# Patient Record
Sex: FEMALE | Race: WHITE | NOT HISPANIC OR LATINO | Employment: UNEMPLOYED | URBAN - METROPOLITAN AREA
[De-identification: names, ages, dates, MRNs, and addresses within clinical notes are randomized per-mention and may not be internally consistent; named-entity substitution may affect disease eponyms.]

---

## 2017-08-04 ENCOUNTER — TRANSFERRED RECORDS (OUTPATIENT)
Dept: HEALTH INFORMATION MANAGEMENT | Facility: CLINIC | Age: 52
End: 2017-08-04

## 2017-09-08 ENCOUNTER — TRANSFERRED RECORDS (OUTPATIENT)
Dept: HEALTH INFORMATION MANAGEMENT | Facility: CLINIC | Age: 52
End: 2017-09-08

## 2017-09-09 ENCOUNTER — TRANSFERRED RECORDS (OUTPATIENT)
Dept: HEALTH INFORMATION MANAGEMENT | Facility: CLINIC | Age: 52
End: 2017-09-09

## 2018-08-12 DIAGNOSIS — H53.10 SUBJECTIVE VISUAL DISTURBANCE: Primary | ICD-10-CM

## 2018-08-14 ENCOUNTER — OFFICE VISIT (OUTPATIENT)
Dept: OPHTHALMOLOGY | Facility: CLINIC | Age: 53
End: 2018-08-14
Attending: OPHTHALMOLOGY
Payer: COMMERCIAL

## 2018-08-14 DIAGNOSIS — H53.10 SUBJECTIVE VISUAL DISTURBANCE: ICD-10-CM

## 2018-08-14 DIAGNOSIS — H51.9 CONVERGENCE INSUFFICIENCY OR PALSY IN BINOCULAR EYE MOVEMENT: Primary | ICD-10-CM

## 2018-08-14 DIAGNOSIS — H47.10 OPTIC DISC EDEMA: ICD-10-CM

## 2018-08-14 PROCEDURE — 92133 CPTRZD OPH DX IMG PST SGM ON: CPT | Mod: ZF | Performed by: OPHTHALMOLOGY

## 2018-08-14 PROCEDURE — 92083 EXTENDED VISUAL FIELD XM: CPT | Mod: ZF | Performed by: OPHTHALMOLOGY

## 2018-08-14 PROCEDURE — G0463 HOSPITAL OUTPT CLINIC VISIT: HCPCS | Mod: 25,ZF

## 2018-08-14 PROCEDURE — 92060 SENSORIMOTOR EXAMINATION: CPT | Mod: ZF | Performed by: OPHTHALMOLOGY

## 2018-08-14 RX ORDER — LEVOTHYROXINE SODIUM 175 UG/1
175 TABLET ORAL DAILY
COMMUNITY

## 2018-08-14 RX ORDER — ACETAZOLAMIDE 500 MG/1
500 CAPSULE, EXTENDED RELEASE ORAL 2 TIMES DAILY
Qty: 60 CAPSULE | Refills: 9 | Status: SHIPPED | OUTPATIENT
Start: 2018-08-14

## 2018-08-14 RX ORDER — HYDROCHLOROTHIAZIDE 25 MG/1
25 TABLET ORAL
Status: ON HOLD | COMMUNITY
End: 2022-08-30

## 2018-08-14 RX ORDER — NAPROXEN 500 MG/1
500 TABLET ORAL
Status: ON HOLD | COMMUNITY
End: 2022-08-30

## 2018-08-14 RX ORDER — ALBUTEROL SULFATE 90 UG/1
2 AEROSOL, METERED RESPIRATORY (INHALATION) EVERY 4 HOURS PRN
COMMUNITY

## 2018-08-14 RX ORDER — BUDESONIDE AND FORMOTEROL FUMARATE DIHYDRATE 160; 4.5 UG/1; UG/1
2 AEROSOL RESPIRATORY (INHALATION) 2 TIMES DAILY
Status: ON HOLD | COMMUNITY
End: 2022-08-31

## 2018-08-14 RX ORDER — AMITRIPTYLINE HYDROCHLORIDE 100 MG/1
200 TABLET ORAL AT BEDTIME
Status: ON HOLD | COMMUNITY
End: 2022-09-02

## 2018-08-14 RX ORDER — LISINOPRIL 40 MG/1
20 TABLET ORAL
Status: ON HOLD | COMMUNITY
End: 2022-08-30

## 2018-08-14 RX ORDER — LOPERAMIDE HCL 2 MG
2 CAPSULE ORAL
Status: ON HOLD | COMMUNITY
End: 2022-08-30

## 2018-08-14 RX ORDER — METOPROLOL SUCCINATE 25 MG/1
25 TABLET, EXTENDED RELEASE ORAL
Status: ON HOLD | COMMUNITY
End: 2022-08-30

## 2018-08-14 ASSESSMENT — CUP TO DISC RATIO
OS_RATIO: 0.0
OD_RATIO: 0.0

## 2018-08-14 ASSESSMENT — VISUAL ACUITY
OD_CC+: +2
OS_CC: 20/25
OS_CC+: -2
OD_CC: 20/40
METHOD: SNELLEN - LINEAR
CORRECTION_TYPE: CONTACTS

## 2018-08-14 ASSESSMENT — EXTERNAL EXAM - RIGHT EYE: OD_EXAM: NORMAL

## 2018-08-14 ASSESSMENT — REFRACTION_WEARINGRX
OS_SPHERE: +3.00
OD_SPHERE: +3.25
SPECS_TYPE: CONTACTS

## 2018-08-14 ASSESSMENT — CONF VISUAL FIELD
OD_NORMAL: 1
OS_NORMAL: 1

## 2018-08-14 ASSESSMENT — EXTERNAL EXAM - LEFT EYE: OS_EXAM: NORMAL

## 2018-08-14 ASSESSMENT — TONOMETRY
OS_IOP_MMHG: 12
OD_IOP_MMHG: 15
IOP_METHOD: ICARE

## 2018-08-14 ASSESSMENT — SLIT LAMP EXAM - LIDS
COMMENTS: NORMAL
COMMENTS: NORMAL

## 2018-08-14 NOTE — NURSING NOTE
Chief Complaints and History of Present Illnesses   Patient presents with     Neurologic Problem     papilledema     HPI    Symptoms:              Comments:  Kylah is a 53 year old female referred for:    1. Papilledema  Seen at [facility] was done (Sept 2017) and revealed elevated opening pressure of 23 mmHg. She was started on 250 mg Diamox BID. Did not tolerate diamox. Caused allergic reaction (face was swollen).   Headaches started over two years ago. Thought they were sinus headaches. Intermittent. Daily. Can last all day. Improve with tylenol or ibuprofen. Some days are worse than others. Position does not affect headache.   No pulsatile tinnitus.   C/o blurred vision, that's worse when the headache is worse.   Brain MRI done august 2017 (at a hospital in [place]). Unremarkable per patient account.     Anitha DIALLO 1:17 PM August 14, 2018
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
## 2018-08-14 NOTE — MR AVS SNAPSHOT
After Visit Summary   2018    Kylah Gutierrez    MRN: [id number]           Patient Information     Date Of Birth          1965        Visit Information        Provider Department      2018 1:30 PM Chuck Ochoa MD [facility]        Today's Diagnoses     Convergence insufficiency or palsy in binocular eye movement    -  1    Subjective visual disturbance        Optic disc edema           Follow-ups after your visit        Your next 10 appointments already scheduled     Sep 05, 2018  3:00 PM CDT   RETURN NEURO with Chuck Ochoa MD   Eye Clinic ([facility])    [address]  [facility] [address]   [phone]              Future tests that were ordered for you today     Open Future Orders        Priority Expected Expires Ordered    MR Orbit w/o & w Contrast Routine  2019    MRV BRAIN  WO & W CONTRAST Routine  2019    MR Brain w/o & w Contrast Routine  2019            Who to contact     Please call your clinic at [phone] to:    Ask questions about your health    Make or cancel appointments    Discuss your medicines    Learn about your test results    Speak to your doctor            Additional Information About Your Visit        MyChart Information     XIFINhart is an electronic gateway that provides easy, online access to your medical records. With [employer], you can request a clinic appointment, read your test results, renew a prescription or communicate with your care team.     To sign up for Trendrt visit the website at [email or web address]   You will be asked to enter the access code listed below, as well as some personal information. Please follow the directions to create your username and password.     Your access code is: [email or web address]  Expires: 10/29/2018 12:16 PM     Your access code will  in 90 days. If you need help or a new code, please  contact your [facility] or call [phone] for assistance.        Care EveryWhere ID     This is your Care EveryWhere ID. This could be used by other organizations to access your [facility] medical records  [id number]         Blood Pressure from Last 3 Encounters:   No data found for BP    Weight from Last 3 Encounters:   No data found for Wt              We Performed the Following     Glaucoma Top OU     IOP Measurement     OCT Optic Nerve RNFL Spectralis OU (both eyes)     Sensorimotor          Today's Medication Changes          These changes are accurate as of 8/14/18  3:31 PM.  If you have any questions, ask your nurse or doctor.               Start taking these medicines.        Dose/Directions    acetaZOLAMIDE 500 MG 12 hr capsule   Commonly known as:  DIAMOX SEQUELS   Used for:  Optic disc edema   Started by:  Chuck Ochoa MD        Dose:  500 mg   Take 1 capsule (500 mg) by mouth 2 times daily   Quantity:  60 capsule   Refills:  9            Where to get your medicines      Some of these will need a paper prescription and others can be bought over the counter.  Ask your nurse if you have questions.     Bring a paper prescription for each of these medications     acetaZOLAMIDE 500 MG 12 hr capsule                Primary Care Provider Office Phone # Fax #    Ronna Jean [phone]       [place] [address]        Equal Access to Services     JB JOHN : Rojelio caballeroo Soomaali, waaxda luqadaha, qaybta kaalmada adeegyada, chandler angeles. So [facility] [phone].    ATENCIÓN: Si habla español, tiene a burr disposición servicios gratuitos de asistencia lingüística. Llame al [phone].    We comply with applicable federal civil rights laws and [place] laws. We do not discriminate on the basis of race, color, national origin, age, disability, sex, sexual  orientation, or gender identity.            Thank you!     Thank you for choosing EYE CLINIC  for your care. Our goal is always to provide you with excellent care. Hearing back from our patients is one way we can continue to improve our services. Please take a few minutes to complete the written survey that you may receive in the mail after your visit with us. Thank you!             Your Updated Medication List - Protect others around you: Learn how to safely use, store and throw away your medicines at www.disposemymeds.org.          This list is accurate as of 8/14/18  3:31 PM.  Always use your most recent med list.                   Brand Name Dispense Instructions for use Diagnosis    acetaZOLAMIDE 500 MG 12 hr capsule    DIAMOX SEQUELS    60 capsule    Take 1 capsule (500 mg) by mouth 2 times daily    Optic disc edema       amitriptyline 100 MG tablet    ELAVIL     Take 200 mg by mouth        hydrochlorothiazide 25 MG tablet    HYDRODIURIL     Take 25 mg by mouth        levothyroxine 50 MCG tablet    SYNTHROID/LEVOTHROID     Take 50 mcg by mouth        lisinopril 40 MG tablet    PRINIVIL/ZESTRIL     Take 20 mg by mouth        loperamide 2 MG capsule    IMODIUM     Take 2 mg by mouth        metoprolol succinate 25 MG 24 hr tablet    TOPROL-XL     Take 25 mg by mouth        naproxen 500 MG tablet    NAPROSYN     Take 500 mg by mouth        omeprazole 20 MG CR capsule    priLOSEC     Take 20 mg by mouth        PROAIR  (90 Base) MCG/ACT inhaler   Generic drug:  albuterol      Inhale 2 puffs into the lungs        ranitidine 150 MG tablet    ZANTAC     Take 150 mg by mouth        SUDAFED COUGH OR      Take 30 mg by mouth        SYMBICORT IN
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
## 2018-09-04 DIAGNOSIS — H47.10 PAPILLEDEMA: Primary | ICD-10-CM

## 2022-08-30 ENCOUNTER — APPOINTMENT (OUTPATIENT)
Dept: GENERAL RADIOLOGY | Facility: CLINIC | Age: 57
End: 2022-08-30
Attending: INTERNAL MEDICINE
Payer: COMMERCIAL

## 2022-08-30 ENCOUNTER — APPOINTMENT (OUTPATIENT)
Dept: CARDIOLOGY | Facility: CLINIC | Age: 57
End: 2022-08-30
Attending: INTERNAL MEDICINE
Payer: COMMERCIAL

## 2022-08-30 ENCOUNTER — HOSPITAL ENCOUNTER (INPATIENT)
Facility: CLINIC | Age: 57
LOS: 3 days | Discharge: HOME | End: 2022-09-02
Attending: INTERNAL MEDICINE | Admitting: INTERNAL MEDICINE
Payer: COMMERCIAL

## 2022-08-30 ENCOUNTER — TELEPHONE (OUTPATIENT)
Dept: NEUROLOGY | Facility: CLINIC | Age: 57
End: 2022-08-30

## 2022-08-30 ENCOUNTER — TRANSFERRED RECORDS (OUTPATIENT)
Dept: HEALTH INFORMATION MANAGEMENT | Facility: CLINIC | Age: 57
End: 2022-08-30

## 2022-08-30 ENCOUNTER — APPOINTMENT (OUTPATIENT)
Dept: MRI IMAGING | Facility: CLINIC | Age: 57
End: 2022-08-30
Attending: INTERNAL MEDICINE
Payer: COMMERCIAL

## 2022-08-30 PROBLEM — R47.01 EXPRESSIVE APHASIA: Status: ACTIVE | Noted: 2022-08-30

## 2022-08-30 LAB
ALBUMIN SERPL-MCNC: 3 G/DL (ref 3.4–5)
ALP SERPL-CCNC: 82 U/L (ref 40–150)
ALT SERPL W P-5'-P-CCNC: 22 U/L (ref 0–50)
AMMONIA PLAS-SCNC: 19 UMOL/L (ref 10–50)
ANION GAP SERPL CALCULATED.3IONS-SCNC: 7 MMOL/L (ref 3–14)
AST SERPL W P-5'-P-CCNC: 12 U/L (ref 0–45)
BASE EXCESS BLDV CALC-SCNC: -7.7 MMOL/L (ref -7.7–1.9)
BILIRUB SERPL-MCNC: 0.1 MG/DL (ref 0.2–1.3)
BUN SERPL-MCNC: 12 MG/DL (ref 7–30)
CA-I BLD-MCNC: 4.9 MG/DL (ref 4.4–5.2)
CALCIUM SERPL-MCNC: 8.3 MG/DL (ref 8.5–10.1)
CHLORIDE BLD-SCNC: 115 MMOL/L (ref 94–109)
CO2 SERPL-SCNC: 19 MMOL/L (ref 20–32)
CREAT SERPL-MCNC: 0.9 MG/DL (ref 0.52–1.04)
ERYTHROCYTE [DISTWIDTH] IN BLOOD BY AUTOMATED COUNT: 15.1 % (ref 10–15)
GFR SERPL CREATININE-BSD FRML MDRD: 75 ML/MIN/1.73M2
GLUCOSE BLD-MCNC: 150 MG/DL (ref 70–99)
GLUCOSE BLDC GLUCOMTR-MCNC: 118 MG/DL (ref 70–99)
GLUCOSE BLDC GLUCOMTR-MCNC: 137 MG/DL (ref 70–99)
GLUCOSE BLDC GLUCOMTR-MCNC: 79 MG/DL (ref 70–99)
GLUCOSE BLDC GLUCOMTR-MCNC: 89 MG/DL (ref 70–99)
HCO3 BLDV-SCNC: 19 MMOL/L (ref 21–28)
HCT VFR BLD AUTO: 37 % (ref 35–47)
HGB BLD-MCNC: 10.7 G/DL (ref 11.7–15.7)
INR PPP: 1.04 (ref 0.85–1.15)
LACTATE SERPL-SCNC: 1.4 MMOL/L (ref 0.7–2)
LVEF ECHO: NORMAL
MAGNESIUM SERPL-MCNC: 2.2 MG/DL (ref 1.6–2.3)
MCH RBC QN AUTO: 24.8 PG (ref 26.5–33)
MCHC RBC AUTO-ENTMCNC: 28.9 G/DL (ref 31.5–36.5)
MCV RBC AUTO: 86 FL (ref 78–100)
MRSA DNA SPEC QL NAA+PROBE: NEGATIVE
NT-PROBNP SERPL-MCNC: 304 PG/ML (ref 0–900)
O2/TOTAL GAS SETTING VFR VENT: 0 %
PCO2 BLDV: 43 MM HG (ref 40–50)
PH BLDV: 7.26 [PH] (ref 7.32–7.43)
PHOSPHATE SERPL-MCNC: 3.3 MG/DL (ref 2.5–4.5)
PLATELET # BLD AUTO: 292 10E3/UL (ref 150–450)
PO2 BLDV: 46 MM HG (ref 25–47)
POTASSIUM BLD-SCNC: 3.9 MMOL/L (ref 3.4–5.3)
PROCALCITONIN SERPL-MCNC: 0.12 NG/ML
PROT SERPL-MCNC: 6.4 G/DL (ref 6.8–8.8)
RBC # BLD AUTO: 4.31 10E6/UL (ref 3.8–5.2)
SA TARGET DNA: NEGATIVE
SODIUM SERPL-SCNC: 141 MMOL/L (ref 133–144)
T4 FREE SERPL-MCNC: 0.98 NG/DL (ref 0.76–1.46)
TROPONIN I SERPL HS-MCNC: 9 NG/L
TSH SERPL DL<=0.005 MIU/L-ACNC: 0.13 MU/L (ref 0.4–4)
WBC # BLD AUTO: 8 10E3/UL (ref 4–11)

## 2022-08-30 PROCEDURE — 84443 ASSAY THYROID STIM HORMONE: CPT | Performed by: INTERNAL MEDICINE

## 2022-08-30 PROCEDURE — 70553 MRI BRAIN STEM W/O & W/DYE: CPT

## 2022-08-30 PROCEDURE — 99292 CRITICAL CARE ADDL 30 MIN: CPT | Performed by: INTERNAL MEDICINE

## 2022-08-30 PROCEDURE — 82330 ASSAY OF CALCIUM: CPT | Performed by: INTERNAL MEDICINE

## 2022-08-30 PROCEDURE — 84145 PROCALCITONIN (PCT): CPT | Performed by: INTERNAL MEDICINE

## 2022-08-30 PROCEDURE — 84100 ASSAY OF PHOSPHORUS: CPT | Performed by: INTERNAL MEDICINE

## 2022-08-30 PROCEDURE — 999N000157 HC STATISTIC RCP TIME EA 10 MIN

## 2022-08-30 PROCEDURE — C9113 INJ PANTOPRAZOLE SODIUM, VIA: HCPCS | Performed by: INTERNAL MEDICINE

## 2022-08-30 PROCEDURE — 999N000065 XR CHEST PORT 1 VIEW

## 2022-08-30 PROCEDURE — 93005 ELECTROCARDIOGRAM TRACING: CPT

## 2022-08-30 PROCEDURE — 84484 ASSAY OF TROPONIN QUANT: CPT | Performed by: INTERNAL MEDICINE

## 2022-08-30 PROCEDURE — 74018 RADEX ABDOMEN 1 VIEW: CPT

## 2022-08-30 PROCEDURE — 250N000009 HC RX 250: Performed by: INTERNAL MEDICINE

## 2022-08-30 PROCEDURE — 83880 ASSAY OF NATRIURETIC PEPTIDE: CPT | Performed by: INTERNAL MEDICINE

## 2022-08-30 PROCEDURE — 999N000128 HC STATISTIC PERIPHERAL IV START W/O US GUIDANCE

## 2022-08-30 PROCEDURE — 999N000009 HC STATISTIC AIRWAY CARE

## 2022-08-30 PROCEDURE — 200N000001 HC R&B ICU

## 2022-08-30 PROCEDURE — 82140 ASSAY OF AMMONIA: CPT | Performed by: INTERNAL MEDICINE

## 2022-08-30 PROCEDURE — 82803 BLOOD GASES ANY COMBINATION: CPT | Performed by: INTERNAL MEDICINE

## 2022-08-30 PROCEDURE — 255N000002 HC RX 255 OP 636: Performed by: INTERNAL MEDICINE

## 2022-08-30 PROCEDURE — 83735 ASSAY OF MAGNESIUM: CPT | Performed by: INTERNAL MEDICINE

## 2022-08-30 PROCEDURE — 250N000013 HC RX MED GY IP 250 OP 250 PS 637: Performed by: INTERNAL MEDICINE

## 2022-08-30 PROCEDURE — 258N000003 HC RX IP 258 OP 636: Performed by: INTERNAL MEDICINE

## 2022-08-30 PROCEDURE — 94002 VENT MGMT INPAT INIT DAY: CPT

## 2022-08-30 PROCEDURE — 80053 COMPREHEN METABOLIC PANEL: CPT | Performed by: INTERNAL MEDICINE

## 2022-08-30 PROCEDURE — 250N000011 HC RX IP 250 OP 636

## 2022-08-30 PROCEDURE — 99207 PR NO BILLABLE SERVICE THIS VISIT: CPT | Performed by: INTERNAL MEDICINE

## 2022-08-30 PROCEDURE — A9585 GADOBUTROL INJECTION: HCPCS | Performed by: INTERNAL MEDICINE

## 2022-08-30 PROCEDURE — 82962 GLUCOSE BLOOD TEST: CPT

## 2022-08-30 PROCEDURE — 99291 CRITICAL CARE FIRST HOUR: CPT | Mod: GC | Performed by: STUDENT IN AN ORGANIZED HEALTH CARE EDUCATION/TRAINING PROGRAM

## 2022-08-30 PROCEDURE — 87040 BLOOD CULTURE FOR BACTERIA: CPT | Performed by: INTERNAL MEDICINE

## 2022-08-30 PROCEDURE — 80335 ANTIDEPRESSANT TRICYCLIC 1/2: CPT | Performed by: INTERNAL MEDICINE

## 2022-08-30 PROCEDURE — 999N000208 ECHOCARDIOGRAM COMPLETE

## 2022-08-30 PROCEDURE — 85610 PROTHROMBIN TIME: CPT | Performed by: INTERNAL MEDICINE

## 2022-08-30 PROCEDURE — 93010 ELECTROCARDIOGRAM REPORT: CPT | Mod: 76 | Performed by: INTERNAL MEDICINE

## 2022-08-30 PROCEDURE — 36415 COLL VENOUS BLD VENIPUNCTURE: CPT | Performed by: INTERNAL MEDICINE

## 2022-08-30 PROCEDURE — 84439 ASSAY OF FREE THYROXINE: CPT | Performed by: INTERNAL MEDICINE

## 2022-08-30 PROCEDURE — 93306 TTE W/DOPPLER COMPLETE: CPT | Mod: 26 | Performed by: INTERNAL MEDICINE

## 2022-08-30 PROCEDURE — 99291 CRITICAL CARE FIRST HOUR: CPT | Performed by: INTERNAL MEDICINE

## 2022-08-30 PROCEDURE — 87641 MR-STAPH DNA AMP PROBE: CPT | Performed by: INTERNAL MEDICINE

## 2022-08-30 PROCEDURE — 5A1935Z RESPIRATORY VENTILATION, LESS THAN 24 CONSECUTIVE HOURS: ICD-10-PCS | Performed by: INTERNAL MEDICINE

## 2022-08-30 PROCEDURE — 83605 ASSAY OF LACTIC ACID: CPT | Performed by: INTERNAL MEDICINE

## 2022-08-30 PROCEDURE — 85027 COMPLETE CBC AUTOMATED: CPT | Performed by: INTERNAL MEDICINE

## 2022-08-30 PROCEDURE — 250N000011 HC RX IP 250 OP 636: Performed by: INTERNAL MEDICINE

## 2022-08-30 RX ORDER — NALOXONE HYDROCHLORIDE 0.4 MG/ML
0.2 INJECTION, SOLUTION INTRAMUSCULAR; INTRAVENOUS; SUBCUTANEOUS
Status: DISCONTINUED | OUTPATIENT
Start: 2022-08-30 | End: 2022-09-02 | Stop reason: HOSPADM

## 2022-08-30 RX ORDER — SODIUM CHLORIDE 9 MG/ML
INJECTION, SOLUTION INTRAVENOUS CONTINUOUS
Status: DISCONTINUED | OUTPATIENT
Start: 2022-08-30 | End: 2022-09-01 | Stop reason: CLARIF

## 2022-08-30 RX ORDER — HYDROMORPHONE HCL IN WATER/PF 6 MG/30 ML
.2-.4 PATIENT CONTROLLED ANALGESIA SYRINGE INTRAVENOUS
Status: DISCONTINUED | OUTPATIENT
Start: 2022-08-30 | End: 2022-09-02 | Stop reason: HOSPADM

## 2022-08-30 RX ORDER — LEVOTHYROXINE SODIUM 175 UG/1
175 TABLET ORAL
Status: DISCONTINUED | OUTPATIENT
Start: 2022-08-30 | End: 2022-09-01

## 2022-08-30 RX ORDER — NALOXONE HYDROCHLORIDE 0.4 MG/ML
0.4 INJECTION, SOLUTION INTRAMUSCULAR; INTRAVENOUS; SUBCUTANEOUS
Status: DISCONTINUED | OUTPATIENT
Start: 2022-08-30 | End: 2022-09-02 | Stop reason: HOSPADM

## 2022-08-30 RX ORDER — VITAMIN B COMPLEX
1 TABLET ORAL DAILY
COMMUNITY

## 2022-08-30 RX ORDER — PROPOFOL 10 MG/ML
5-75 INJECTION, EMULSION INTRAVENOUS CONTINUOUS
Status: DISCONTINUED | OUTPATIENT
Start: 2022-08-30 | End: 2022-09-01 | Stop reason: CLARIF

## 2022-08-30 RX ORDER — ASPIRIN 81 MG/1
81 TABLET ORAL DAILY
COMMUNITY

## 2022-08-30 RX ORDER — CHLORHEXIDINE GLUCONATE ORAL RINSE 1.2 MG/ML
15 SOLUTION DENTAL EVERY 12 HOURS
Status: DISCONTINUED | OUTPATIENT
Start: 2022-08-30 | End: 2022-09-01

## 2022-08-30 RX ORDER — LOSARTAN POTASSIUM 50 MG/1
50 TABLET ORAL DAILY
COMMUNITY

## 2022-08-30 RX ORDER — AMPICILLIN AND SULBACTAM 2; 1 G/1; G/1
3 INJECTION, POWDER, FOR SOLUTION INTRAMUSCULAR; INTRAVENOUS EVERY 6 HOURS
Status: DISCONTINUED | OUTPATIENT
Start: 2022-08-30 | End: 2022-09-01

## 2022-08-30 RX ORDER — FAMOTIDINE 20 MG/1
20 TABLET, FILM COATED ORAL 2 TIMES DAILY
COMMUNITY

## 2022-08-30 RX ORDER — DEXTROSE MONOHYDRATE 25 G/50ML
25-50 INJECTION, SOLUTION INTRAVENOUS
Status: DISCONTINUED | OUTPATIENT
Start: 2022-08-30 | End: 2022-09-02 | Stop reason: HOSPADM

## 2022-08-30 RX ORDER — PROPOFOL 10 MG/ML
INJECTION, EMULSION INTRAVENOUS
Status: COMPLETED
Start: 2022-08-30 | End: 2022-08-30

## 2022-08-30 RX ORDER — NICOTINE POLACRILEX 4 MG
15-30 LOZENGE BUCCAL
Status: DISCONTINUED | OUTPATIENT
Start: 2022-08-30 | End: 2022-09-02 | Stop reason: HOSPADM

## 2022-08-30 RX ORDER — ASPIRIN 81 MG/1
81 TABLET, CHEWABLE ORAL DAILY
Status: DISCONTINUED | OUTPATIENT
Start: 2022-08-30 | End: 2022-09-02

## 2022-08-30 RX ORDER — ROSUVASTATIN CALCIUM 40 MG/1
40 TABLET, COATED ORAL AT BEDTIME
COMMUNITY

## 2022-08-30 RX ORDER — GADOBUTROL 604.72 MG/ML
10 INJECTION INTRAVENOUS ONCE
Status: COMPLETED | OUTPATIENT
Start: 2022-08-30 | End: 2022-08-30

## 2022-08-30 RX ORDER — METOPROLOL TARTRATE 25 MG/1
25 TABLET, FILM COATED ORAL 2 TIMES DAILY
COMMUNITY

## 2022-08-30 RX ORDER — ALBUTEROL SULFATE 0.83 MG/ML
2.5 SOLUTION RESPIRATORY (INHALATION)
Status: DISCONTINUED | OUTPATIENT
Start: 2022-08-30 | End: 2022-09-02 | Stop reason: HOSPADM

## 2022-08-30 RX ADMIN — HYDROMORPHONE HYDROCHLORIDE 0.2 MG: 0.2 INJECTION, SOLUTION INTRAMUSCULAR; INTRAVENOUS; SUBCUTANEOUS at 15:46

## 2022-08-30 RX ADMIN — HYDROMORPHONE HYDROCHLORIDE 0.2 MG: 0.2 INJECTION, SOLUTION INTRAMUSCULAR; INTRAVENOUS; SUBCUTANEOUS at 15:14

## 2022-08-30 RX ADMIN — CHLORHEXIDINE GLUCONATE 15 ML: 1.2 SOLUTION ORAL at 08:20

## 2022-08-30 RX ADMIN — AMPICILLIN SODIUM AND SULBACTAM SODIUM 3 G: 2; 1 INJECTION, POWDER, FOR SOLUTION INTRAMUSCULAR; INTRAVENOUS at 15:28

## 2022-08-30 RX ADMIN — PROPOFOL 35 MCG/KG/MIN: 10 INJECTION, EMULSION INTRAVENOUS at 12:48

## 2022-08-30 RX ADMIN — LEVOTHYROXINE SODIUM 175 MCG: 175 TABLET ORAL at 12:52

## 2022-08-30 RX ADMIN — PROPOFOL 10 MCG/KG/MIN: 10 INJECTION, EMULSION INTRAVENOUS at 05:45

## 2022-08-30 RX ADMIN — PROPOFOL 35 MCG/KG/MIN: 10 INJECTION, EMULSION INTRAVENOUS at 19:17

## 2022-08-30 RX ADMIN — PANTOPRAZOLE SODIUM 40 MG: 40 INJECTION, POWDER, FOR SOLUTION INTRAVENOUS at 08:16

## 2022-08-30 RX ADMIN — GADOBUTROL 8 ML: 604.72 INJECTION INTRAVENOUS at 17:05

## 2022-08-30 RX ADMIN — PROPOFOL 50 MCG/KG/MIN: 10 INJECTION, EMULSION INTRAVENOUS at 16:25

## 2022-08-30 RX ADMIN — SODIUM CHLORIDE: 9 INJECTION, SOLUTION INTRAVENOUS at 12:00

## 2022-08-30 RX ADMIN — SODIUM BICARBONATE 100 MEQ: 84 INJECTION, SOLUTION INTRAVENOUS at 20:37

## 2022-08-30 RX ADMIN — AMPICILLIN SODIUM AND SULBACTAM SODIUM 3 G: 2; 1 INJECTION, POWDER, FOR SOLUTION INTRAMUSCULAR; INTRAVENOUS at 20:36

## 2022-08-30 RX ADMIN — HYDROMORPHONE HYDROCHLORIDE 0.2 MG: 0.2 INJECTION, SOLUTION INTRAMUSCULAR; INTRAVENOUS; SUBCUTANEOUS at 11:25

## 2022-08-30 RX ADMIN — CHLORHEXIDINE GLUCONATE 15 ML: 1.2 SOLUTION ORAL at 20:37

## 2022-08-30 RX ADMIN — HYDROMORPHONE HYDROCHLORIDE 0.4 MG: 0.2 INJECTION, SOLUTION INTRAMUSCULAR; INTRAVENOUS; SUBCUTANEOUS at 21:03

## 2022-08-30 RX ADMIN — AMPICILLIN SODIUM AND SULBACTAM SODIUM 3 G: 2; 1 INJECTION, POWDER, FOR SOLUTION INTRAMUSCULAR; INTRAVENOUS at 08:10

## 2022-08-30 RX ADMIN — HUMAN ALBUMIN MICROSPHERES AND PERFLUTREN 9 ML: 10; .22 INJECTION, SOLUTION INTRAVENOUS at 15:18

## 2022-08-30 ASSESSMENT — ACTIVITIES OF DAILY LIVING (ADL)
ADLS_ACUITY_SCORE: 40
ADLS_ACUITY_SCORE: 41
ADLS_ACUITY_SCORE: 37
ADLS_ACUITY_SCORE: 40
ADLS_ACUITY_SCORE: 37
ADLS_ACUITY_SCORE: 40
ADLS_ACUITY_SCORE: 40

## 2022-08-30 NOTE — PLAN OF CARE
Neuro: RASS -2, meeting goal. Patient quickly becomes agitated when off sedation. PERRLA. Inconsistent in following commands. Moves all extremities.     Cardiac: Murmer. SR with PVC's    Respiratory: Vent CMV/AC. Small amount of thick, creamy secretions from ETT    GI: Last BM PTA    : Alexis in place, good output    Skin: No issues. Mepilex in place.     Shift Events: MRI brain completed. Update given to daughter Silvia by phone.     Plan of Care: Pending neurology plan.

## 2022-08-30 NOTE — PROGRESS NOTES
"Follow-up note. Please see H&P by Dr. Angela earlier today for further details.      Assessment and Plan:  This is a 57 year old woman with PMH significant for HTN, chronic intracranial hypertension, CAD s/p PCI of RCA 12/2021 who presented to an outside ED with confusion and agitation, concern for aphasia but with non-focal neuro exam, ultimately intubated to obtain imaging and transferred here for further workup and management. In reviewing outside ED notes in paper chart, nursing there noted patient said she \"took a bunch of drugs\" but did not say what she took.     Neuro:   #Encephalopathy  #Expressive aphasia concerning for CVA, TIA  #Chronic intracranial hypertension causing papilledema  #Depression  Exam remains non-focal. UDS from outside facility reviewed, positive for tricyclics (pt on amitriptyline) and benzos (pt received Ativan in ED), otherwise negative UDS. Tricyclic overdose certainly a possibility. QRS mildly elevated at 138 on admission, now improved at 120ms (per care everywhere last EKG 2019 with QRS 108ms). TSH mildly low but free T4 normal.   -Stat MRI ordered; currently pending safety checklist   -Neuro crit consulted, appreciate recs   - LP recommended. Will contact radiology regarding LP in the setting of outpatient ticagrelor therapy.   -Ammonia level, Tylenol, salicylates, ethanol within normal limits  -Repeat EKG in AM  -Blood cultures ordered  -Holding PTA amitriptyline. Levels drawn and pending.      Cardiac:   #CAD s/p NSTEMI in 12/2021 with PCI to mid-RCA   #Hx Aortic Stenosis  Last echo performed in 12/2/2021 showed LVEF of 60% and a heavily calcified aortic valve with moderate to severe stenosis and mild regurgitation.  EKG performed here and no ischemic changes and troponins have been negative. Patient's daughter, reached by telephone, reports pt had syncopal episode at some point in the recent past.   -Continue PTA metoprolol as able  -Will hold PTA aspirin 81 mg and ticagrelor " pending neuro evaluation  -Echo ordered to evaluate aortic stenosis.      Pulm:   Vent Mode: CMV/AC  (Continuous Mandatory Ventilation/ Assist Control)  FiO2 (%): 30 %  Resp Rate (Set): 20 breaths/min  Tidal Volume (Set, mL): 460 mL  PEEP (cm H2O): 5 cmH2O  Resp: 20  She was intubated in order to facilitate brain imaging given agitation and restlessness.  Chest x-ray shows ET tube in good position but hazy opacity in the mid left lung.  She is on minimal vent settings.  Given she is high risk for aspiration pneumonia and does have moderate amount of secretions, empiric Unasyn started. VBG shows mild metabolic acidosis at 7.26/43/46/19.   - sputum culture     Renal:   #Hx of CKD3 per chart review  #Mild non-anion gap metabolic acidosis.   Creatinine is 0.9 and she is making good urine via Palafox. VBG as above. Lactate WNL. Albumin corrected anion gap normal at 9.5. No reported vomiting/diarrhea prior to presentation and potassium normal. She did receive 1L NS bolus at outside ED, none since.   -Monitor urine output, I's/O's  -Avoid nephrotoxins  -Daily BMP     GI:   #GERD  Patient was on Zantac and Prilosec PTA.  -Continue Protonix.      ID:   #Possible aspiration pneumonia  Chest x-ray with left lung infiltrate, moderate secretions.  Pro-Greg 0.12.  High risk for aspiration.  We will start Unasyn.  -Blood and sputum cultures ordered     Heme:   #Normocytic anemia  No signs of blood loss at this time.  Last hemoglobin in Care Everywhere was 12.5 but was 2 years ago.  Will monitor.     Endo:   #Hypothyroidism  Blood glucose levels are acceptable. TSH mildly low at 0.13 but free T4 normal at 0.98. Continue monitoring per ICU protocol.  - continue PTA levothyroxine      ICU:   DVT ppx: heparin (hold at this time pending neuro plan)  GI ppx: Protonix  Lines: ETT, palafox, OG  Code: Full  Family: Spoke with patient's daughter by phone. Daughter is updated on current status and plan for workup.       Subjective:  Patient  remains intubated and sedated. Further history obtained from pt's daughter by telephone. .    Objective:  Temp: 97.5  F (36.4  C) Temp src: Bladder BP: 112/64 Pulse: 67   Resp: 20 SpO2: 100 % O2 Device: Mechanical Ventilator     General: Mildly uncomfortable appearing woman, lying in hospital bed. No acute distress. Not interactive.  HEENT: ET tube in place.   Lungs: Mechanically ventilated. Clear to anterior auscultation bilaterally without crackles, wheezing, or rales.   Heart: Loud systolic murmur present. Regular rate and rhythm.   Ext: No pitting edema bilaterally.   Neuro: Opens eyes spontaneously but does not make eye contact. Able to squeeze fingers and move feet bilaterally to command but with some difficulty/hesitation. No obvious focal weakness or deficit.     Yamil Richardson, MS4      Attending Intensivist note  I fully assessed patient myself including history, physical exam, and review of data. I also discussed case with Yamil Richardson including bedside exam together and appreciate his assistance and fine care.     Neck supple; Lungs clear bilaterally; heart RRR; abdomen soft and non-tender; extremities are warm with minimal edema; skin shows no cyanosis or mottling; moves all extremities well to command.     Assessment and plan  1. Depression of consciousness- may be due to TCA Particularly given negative work up thus far. We will continue to support and follow. She remains on Unasyn but no evidence of infection at this time.   2. Acute respiratory failure due to #1, and will move towards extubation when clinically improved. She remains on minimal vent settings at 30% and +5 PEEP.   Overall, acute and critical. I spent 35 minutes of critical care time with patient.

## 2022-08-30 NOTE — CONSULTS
[facility]    Stroke Consult Note    Reason for Consult:      Chief Complaint: AMS, expressive Aphasia    HPI  Assessment/Plan  Kylah Gutierrez is a 57 year old female with a past medical history of CAD, NSTEMI 12/21 s/p PCI, asthma, increased ICP causing papilledema, hypothyroidism, CKD, JAY who is admitted on 8/30/2022 for altered mental state and expressive aphasia which was noted by coworkers at a [employer] when she reported for night shift yesterday.  Patient was seen in  An OSH where she was found to be agitated and combative, she was able to follow commands and had no focal deficit. She had to be intubated to have CT/CTA done which were unremarkable as per chart.  She also had labs done which were unremarkable with pending UTS.   She received Ativan 2 mg, propofol, ketamine and rocuronium at the OSH  No LKWT or onset known.   History is unobtainable from the patieNT    24 hour events:  Propofol increased to 20 mg as patient was restless.  MRI awaited; unable to contact family.    Impression  # Acute mental state change Iin a patient with history of IIH rule out secondary IH and stroke  # Expressive aphasia.       Recommendations  - Obtain MRI  - LP for OP.  - UTOX at OSH +ve for amitriptyline and benzo( Ativan was given in ED)  - Neurochecks every 4 hrs  - HOB > 30 .  - SBP goal < 180 mmHg.  - PT/OT/SLP.    Sara Smith MD  _____________________________________________________    Clinically Significant Risk Factors Present on Admission             # Hypoalbuminemia: Albumin = 3.0 g/dL (Ref range: 3.4 - 5.0 g/dL) on admission, will monitor as appropriate   # Platelet Defect: home medication list includes an antiplatelet medication  # Coma: based on GCS score of <8       Past Medical History   Past Medical History:   Diagnosis Date     Hypertension      Past Surgical History   Past Surgical History:   Procedure Laterality Date     AS KNEE SCOPE, DIAGNOSTIC       PARTIAL THROMBOPLASTIN  TIME       TUBAL LIGATION       Medications   Home Meds  Prior to Admission medications    Medication Sig Start Date End Date Taking? Authorizing Provider   acetaZOLAMIDE (DIAMOX SEQUELS) 500 MG 12 hr capsule Take 1 capsule (500 mg) by mouth 2 times daily 8/14/18   Chuck Ochoa MD   albuterol (PROAIR HFA/PROVENTIL HFA/VENTOLIN HFA) 108 (90 Base) MCG/ACT inhaler Inhale 2 puffs into the lungs every 4 hours as needed    Reported, Patient   amitriptyline (ELAVIL) 100 MG tablet Take 200 mg by mouth At Bedtime    Reported, Patient   aspirin 81 MG EC tablet Take 81 mg by mouth daily    Unknown, Entered By History   budesonide-formoterol (SYMBICORT) 160-4.5 MCG/ACT Inhaler Inhale 2 puffs into the lungs 2 times daily    Reported, Patient   famotidine (PEPCID) 20 MG tablet Take 20 mg by mouth 2 times daily    Unknown, Entered By History   levothyroxine (SYNTHROID/LEVOTHROID) 175 MCG tablet Take 175 mcg by mouth daily    Reported, Patient   losartan (COZAAR) 50 MG tablet Take 100 mg by mouth daily    Unknown, Entered By History   metoprolol tartrate (LOPRESSOR) 25 MG tablet Take 25 mg by mouth 2 times daily    Unknown, Entered By History   omeprazole (PRILOSEC) 20 MG CR capsule Take 20 mg by mouth 2 times daily    Reported, Patient   rosuvastatin (CRESTOR) 40 MG tablet Take 40 mg by mouth At Bedtime    Unknown, Entered By History   ticagrelor (BRILINTA) 90 MG tablet Take 90 mg by mouth 2 times daily    Unknown, Entered By History   Vitamin D3 (CHOLECALCIFEROL) 25 mcg (1000 units) tablet Take 1 tablet by mouth daily    Unknown, Entered By History       Scheduled Meds    ampicillin-sulbactam (UNASYN) IV  3 g Intravenous Q6H     [Held by provider] aspirin  81 mg Per G Tube Daily     chlorhexidine  15 mL Mouth/Throat Q12H     levothyroxine  175 mcg Oral or Feeding Tube QAM AC     pantoprazole  40 mg Per Feeding Tube QAM AC    Or     pantoprazole (PROTONIX) IV  40 mg Intravenous QAM AC     [Held by provider]  ticagrelor  90 mg Oral BID       Infusion Meds    propofol (DIPRIVAN) infusion 25 mcg/kg/min (08/30/22 0740)       PRN Meds  albuterol, glucose **OR** dextrose **OR** glucagon, HYDROmorphone, naloxone **OR** naloxone **OR** naloxone **OR** naloxone    Allergies   Allergies   Allergen Reactions     Atorvastatin Rash     Erythromycin Rash     Diamox [Acetazolamide]      Family History   No family history on file.  Social History   Social History     Tobacco Use     Smoking status: Former Smoker     Smokeless tobacco: Never Used       Review of Systems   UNOBTAINABLE        PHYSICAL EXAMINATION   Temp:  [96.1  F (35.6  C)-96.8  F (36  C)] 96.8  F (36  C)  Pulse:  [61-71] 65  Resp:  [11-32] 20  BP: (108-152)/() 134/74  FiO2 (%):  [30 %-40 %] 30 %  SpO2:  [98 %-100 %] 99 %      Vitals: /69   Pulse 63   Temp (!) 96.4  F (35.8  C)   Resp 20   Wt 82.4 kg (181 lb 10.5 oz)   SpO2 99%   General: Adult female patient, lying in bed, critically-ill  HEENT: Normocephalic, atraumatic, no icterus, oral cavity/oropharynx pink and moist  Cardiac: RRR, systolic murmur  Pulm: CTAB, unlabored, expansion symmetric, no retractions or use of accessory muscles  Abdomen: Soft, non-distended, bowel sounds present  Extremities: Warm, no edema, distal pulses +2, well perfused  Skin: No rash or lesion    Neuro:  Mental status: sedated, wiggle toes to verbal stimuli, withdrawsz to pain.  Cranial nerves:  PERRL, face symmetric, shoulder shrug strong, tongue midline, no dysarthria.   Motor: Normal bulk and tone. No abnormal movements. Unable to assess for power.   Sensory: unassessable.  Coordination:not assessed   Gait: PROSPER, deferred.     Dysphagia Screen      Stroke Scales    NIHSS  1a. Level of Consciousness 3-->Responds only with reflex motor or autonomic effects or totally unresponsive, flaccid, and areflexic   1b. LOC Questions 2-->Answers neither question correctly   1c. LOC Commands 2-->Performs neither task correctly   2.    Best Gaze 0-->Normal   3.   Visual 0-->No visual loss (UNABLE TO ASSESS)   4.   Facial Palsy 0-->Normal symmetrical movements   5a. Motor Arm, Left 3-->No effort against gravity, limb falls   5b. Motor Arm, Right 3-->No effort against gravity, limb falls   6a. Motor Leg, Left 3-->No effort against gravity, leg falls to bed immediately   6b. Motor Leg, right 3-->No effort against gravity, leg falls to bed immediately   7.   Limb Ataxia 0-->Absent (UNABLE TO ASSESS)   8.   Sensory 0-->Normal, no sensory loss   9.   Best Language 3-->Mute, global aphasia, no usable speech or auditory comprehension   10. Dysarthria (UN) Intubated or other physical barrier   11. Extinction and Inattention  0-->No abnormality   Total 22 (08/30/22 0914)       Modified Fountain Score (Pre-morbid)  1-No significant disability despite symptoms     Imaging  I personally reviewed all imaging; relevant findings per HPI.    Labs Data   CBC  Recent Labs   Lab 08/30/22  0555   WBC 8.0   RBC 4.31   HGB 10.7*   HCT 37.0        Basic Metabolic Panel   Recent Labs   Lab 08/30/22  0838 08/30/22  0555 08/30/22  0514   NA  --  141  --    POTASSIUM  --  3.9  --    CHLORIDE  --  115*  --    CO2  --  19*  --    BUN  --  12  --    CR  --  0.90  --    * 150* 137*   LIZA  --  8.3*  --      Liver Panel  Recent Labs   Lab 08/30/22  0555   PROTTOTAL 6.4*   ALBUMIN 3.0*   BILITOTAL 0.1*   ALKPHOS 82   AST 12   ALT 22     INR    Recent Labs   Lab Test 08/30/22  0555   INR 1.04      Lipid Profile  No lab results found.  A1C  No lab results found.  Troponin    Recent Labs   Lab 08/30/22  0555   TROPONINIS 9

## 2022-08-30 NOTE — PROGRESS NOTES
[facility] ICU RESPIRATORY NOTE        Date of Admission: 8/30/2022    Date of Intubation (most recent):8/30/22 (Intubated at OSH)    Reason for Mechanical Ventilation:Airway protection    Number of Days on Mechanical Ventilation:1    Met Criteria for Spontaneous Breathing Trial:No    Reason for No Spontaneous Breathing Trial:Per MD    Significant Events Today:Pt transferred from OSH early this morning.    ABG Results:   Recent Labs   Lab 08/30/22  1116   O2PER 0       Current Vent Settings: Vent Mode: CMV/AC  (Continuous Mandatory Ventilation/ Assist Control)  FiO2 (%): 30 %  Resp Rate (Set): 20 breaths/min  Tidal Volume (Set, mL): 460 mL  PEEP (cm H2O): 5 cmH2O  Resp: 20      Skin Assessment:Intact    Plan:Will cont full vent support for now and will assess for weaning readiness daily.    Roopa Gipson RT on 8/30/2022 at 1:45 PM\

## 2022-08-30 NOTE — PLAN OF CARE
Patient arrived to ICU shortly after 0500. Originally was not moving anything but started moving all extremities spontaneously about one hour later (patient did receive a paralytic at outside hospital prior to intubation). Still not following commands. Pupils equal. Stable on ventilator. Lightly sedated on propofol due to intermittent episodes of agitation. Still unable to contact family, plan for brain MRI and neuro consult.

## 2022-08-30 NOTE — TELEPHONE ENCOUNTER
Transfer to [facility] ICU for expressive aphasia, encephalopathy.     56 YO w/PMHx sig for CKD (unclear etiology), IIH, NSTEMI who presents to OSH ED for acute onset expressive aphasia/encephalopathh prior to shift as a . Co-workers took her to ED. Stroke code with OSH telestroke activated, due to agitation she required IV benzos which made her overly sedated/somnolent/obtunded, and was subsequently intubated for airway protection.     Asked for CTA H/N prior to transport - unsure how to review pushed images but was told telestroke Neuro reviewed images and no acute concerns or acute intracranial pathology seen.     [facility] NCC team to see/evaluate in AM.     Jass Hayden MD PGY5 Stroke/Vascular Fellow

## 2022-08-30 NOTE — PROGRESS NOTES
Notified provider about indwelling palafox catheter discussed removal or continued need.    Did provider choose to remove indwelling palafox catheter? No    Provider's palafox indication for keeping indwelling palafox catheter: Strict 1-2 Hour I & O if external catheters are not an option.    Is there an order for indwelling palafox catheter? Yes    *If there is a plan to keep palafox catheter in place at discharge daily notification with provider is not necessary.

## 2022-08-30 NOTE — H&P
Kylah Gutierrez MRN# [id number]   Age: 57 year old YOB: 1965     Date of Admission:  8/30/2022      Primary care provider: Ronna Jean         Assessment and Plan:   Kylah Gutierrez is a 57 year old female with medical history significant for coronary artery disease, NSTEMI 12/2021 s/p PCI, asthma, increased intracranial pressure causing papilledema, hypothyroidism, CKD, obstructive sleep apnea who presented to outside hospital with AMS and expressive aphasia.  CT head and CTA are unremarkable. MRI is pending.     Neuro:   #Encephalopathy  #Expressive aphasia concerning for CVA, TIA  #Chronic intracranial hypertension causing papilledema  #Depression  Nonfocal findings on presentation.  She was intubated to facilitate brain imaging given restlessness and then later agitation.  She moves spontaneously but is encephalopathic on arrival which may be related to Ativan given at outside facility.  -Stat MRI ordered   - neuro crit consulted, appreciated  - TSH pending   -Ammonia level, Tylenol, salicylates, ethanol within normal limits; UDS pending from outside facility  -Blood cultures ordered  -Consider EEG but defer her to neuro crit  -Pharm.D. to clarify whether patient was taking Diamox as this is listed in allergy list without side effects  - holding PTA amitriptyline given AMS; checking level though signs/symptoms not consistent with OD    Cardiac:   #CAD s/p NSTEMI in 12/2021 with PCI to mid-RCA   Last echo performed in 12/2/2021 showed LVEF of 60% and a heavily calcified aortic valve with moderate to severe stenosis and mild regurgitation.  EKG performed here and no ischemic changes and troponins have been negative.  -Continue PTAmetoprolol as able  -Will hold PTA aspirin 81 mg and ticagrelor pending neuro evaluation       Pulm:   Vent Mode: CMV/AC  (Continuous Mandatory Ventilation/ Assist Control)  FiO2 (%): 40 %  Resp Rate (Set): 20 breaths/min  Tidal Volume (Set, mL): 460 mL  PEEP (cm H2O): 5  cmH2O  Resp: 20  She was intubated in order to facilitate brain imaging given agitation and restlessness.  Chest x-ray shows ET tube in good position but hazy opacity in the mid left lung.  She is on minimal vent settings.  Given she is high risk for aspiration pneumonia and does have moderate amount of secretions we will start empiric antibiotic.   - VBG is pending   - sputum culture        Renal:   No acute issues at this time.  Creatinine is 0.9 and she is making good urine via Palafox.  -Monitor urine output, I's/O's  -Avoid nephrotoxins  -Daily BMP    GI:   #GERD  Patient was on Zantac and Prilosec PTA.  We will start Protonix.     ID:   #Possible aspiration pneumonia  Chest x-ray with left lung infiltrate, moderate secretions.  Pro-Greg 0.12.  High risk for aspiration.  We will start Unasyn.  -Blood and sputum cultures ordered    Heme:   #Normocytic anemia  No signs of blood loss at this time.  Last hemoglobin in Care Everywhere was 12.5 but was 2 years ago.  Will monitor.    Endo:   #Hypothyroidism  Blood glucose levels are acceptable.  Continue monitoring per ICU protocol.  -TSH pending  - continue PTA levothyroxine     ICU:   DVT ppx: heparin (hold at this time pending neuro plan)  GI ppx: Protonix  Lines: ETT, palafox, OG  Code: Full  Family: Called mother who is phone number is listed in the chart with no answer    Critical Care Time: 40 min.  I spent this time (excluding procedures) personally providing and directing critical care services at the bedside and on the critical care unit.       Date of Service (when I saw the patient): 8/30/22    Edith Angela MD  Pulmonary, Allergy, Critical Care, and Sleep Medicine   Pager: [phone]            Chief Complaint:     Expressive aphasia          History of Present Illness:     History obtained from EMR.    Patient is a 57-year-old female with medical history significant for coronary artery disease, obstructive lung disease, increased intracranial pressure,  hypothyroidism, CKD, obstructive sleep apnea who presented to work around midnight and was noted to have expressive aphasia by coworkers.  On arrival to the ED she seemed confused and restless but was able to follow commands with no focal findings.  She underwent endotracheal intubation in order to facilitate head imaging safely.    Outside hospital labs reviewed including WBC of 7.9, hemoglobin 12.5, platelet count of 338, salicylates 5.4 (low, Tylenol less than 10, ethanol level less than 10, SARS COVID 2 antigen test negative, sodium 142, potassium 3.7 chloride 111, CO2 19, anion gap 12, BUN 13, creatinine 1.1, glucose 95, LFTs within normal limits, troponin 10 (within normal limits), UA was unremarkable, urine drug screen pending    CT and CT angio of the head were unremarkable per neuro crit report but I am unable to see these images.  They are being pushed into our system.    Medications given at outside hospital include Ativan 2 mg IV x1, propofol, ketamine 100 mg and rocuronium 10 mg at 2:30 AM or used for induction.      Review of Systems:     A full ROS was unable to be performed as patient was intubated and sedated.          Past Medical and Surgical History:     Past Medical History:   Diagnosis Date     Hypertension    Asthma  Known cardiac murmur and severe aortic stenosis  Obstructive sleep apnea  Prior NSTEMI status post PCI in December 2021 (100% occlusion of mid RCA)  CKD, congestive heart failure  Intracranial-pretension   Hpothyroidism   Hypertension      Past Surgical History:   Procedure Laterality Date     AS KNEE SCOPE, DIAGNOSTIC       PARTIAL THROMBOPLASTIN TIME       TUBAL LIGATION             Family History:     Pertinent information reviewed.         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      Social Determinants of Health     Financial Resource Strain: Not on file   Food Insecurity: Not on file   Transportation Needs: Not on file   Physical Activity: Not on file   Stress: Not on file   Social Connections: Not on file   Intimate Partner Violence: Not on file   Housing Stability: Not on file            Allergies and Medications:     Allergies   Allergen Reactions     Atorvastatin Rash     Erythromycin Rash     Diamox [Acetazolamide]      Medications Prior to Admission   Medication Sig Dispense Refill Last Dose     acetaZOLAMIDE (DIAMOX SEQUELS) 500 MG 12 hr capsule Take 1 capsule (500 mg) by mouth 2 times daily 60 capsule 9      albuterol (PROAIR HFA) 108 (90 Base) MCG/ACT inhaler Inhale 2 puffs into the lungs        amitriptyline (ELAVIL) 100 MG tablet Take 200 mg by mouth        Budesonide-Formoterol Fumarate (SYMBICORT IN)         hydrochlorothiazide (HYDRODIURIL) 25 MG tablet Take 25 mg by mouth        levothyroxine (SYNTHROID/LEVOTHROID) 50 MCG tablet Take 50 mcg by mouth        lisinopril (PRINIVIL/ZESTRIL) 40 MG tablet Take 20 mg by mouth        loperamide (IMODIUM) 2 MG capsule Take 2 mg by mouth        metoprolol succinate (TOPROL-XL) 25 MG 24 hr tablet Take 25 mg by mouth        naproxen (NAPROSYN) 500 MG tablet Take 500 mg by mouth        omeprazole (PRILOSEC) 20 MG CR capsule Take 20 mg by mouth        Pseudoephedrine-DM-GG (SUDAFED COUGH OR) Take 30 mg by mouth        ranitidine (ZANTAC) 150 MG tablet Take 150 mg by mouth        Per paper chart, home medications include vitamin D, baby aspirin, losartan, nitroglycerin, albuterol inhaler, levothyroxine  at 175 mcg daily, potassium chloride 20 mill EQ daily, ticagrelor, metoprolol tartrate 25 mg twice a day, omeprazole, famotidine, acetazolamide 500 mg twice a day, rosuvastatin 80 mg daily, amitriptyline 200 mg daily    Physical Exam:   Temp:  [96.1  F (35.6  C)] 96.1  F (35.6  C)  Pulse:  [64] 64  Resp:  [14-20] 20  BP: (108)/(72) 108/72  FiO2 (%):  [40 %] 40 %  SpO2:  [98 %] 98 %    Intake/Output Summary (Last 24 hours) at 8/30/2022 0544  Last data filed at 8/30/2022 0530  Gross per 24 hour   Intake --   Output 450 ml   Net -450 ml       Constitutional:   Asleep but does flutter eyes spontaneously and to voice and in no apparent distress     Eyes:   Nonicteric, BAO, scleral edema noted b/l      ENT:    oral mucosa moist without lesions     Neck:   Supple without supraclavicular or cervical lymphadenopathy     Lungs:   Good air flow b/l, diminished in bases.  No crackles. No rhonchi.  No wheezes. + creamy secretions.      Cardiovascular:   Normal S1 and S2.  RRR.  Harsh systolic ejection murmur. No murmur, gallop or rub.  Radial, DP pulses normal and symmetric     Abdomen:   NABS, soft, nontender, nondistended.  No HSM.     Musculoskeletal:   No edema. No muscular atrophy.      Neurologic:   Encephalopathic. Cough intact. Does move all extremities spontaneously.    Normal muscle tones.  No rigidity.     Skin:   Warm, dry.  No rash on limited exam.           Data:   All laboratory and imaging data personally reviewed.    CMP  Recent Labs   Lab 08/30/22  0514   *     CBCNo lab results found in last 7 days.  INRNo lab results found in last 7 days.  Arterial Blood GasNo lab results found in last 7 days.  Urine Studies  No lab results found.  CMV viral loads  No lab results found.  No results found for: CMQNT, CMVQ  EBV viral loads   No lab results found.  No results found for: EBVDN, EBRES, EBVDN, EBVSP, EBVPC, EBVPCR  Respiratory Virus Testing    No results found for: RS, FLUAG   Sputum Cultures in the last 3  months:  No results found for: SDES No results found for: CULT

## 2022-08-31 LAB
ALBUMIN SERPL-MCNC: 3.1 G/DL (ref 3.4–5)
ALP SERPL-CCNC: 83 U/L (ref 40–150)
ALT SERPL W P-5'-P-CCNC: 21 U/L (ref 0–50)
ANION GAP SERPL CALCULATED.3IONS-SCNC: 8 MMOL/L (ref 3–14)
AST SERPL W P-5'-P-CCNC: 12 U/L (ref 0–45)
BILIRUB SERPL-MCNC: 0.2 MG/DL (ref 0.2–1.3)
BUN SERPL-MCNC: 10 MG/DL (ref 7–30)
CALCIUM SERPL-MCNC: 8.5 MG/DL (ref 8.5–10.1)
CHLORIDE BLD-SCNC: 118 MMOL/L (ref 94–109)
CO2 SERPL-SCNC: 22 MMOL/L (ref 20–32)
CREAT SERPL-MCNC: 0.88 MG/DL (ref 0.52–1.04)
ERYTHROCYTE [DISTWIDTH] IN BLOOD BY AUTOMATED COUNT: 15.4 % (ref 10–15)
GFR SERPL CREATININE-BSD FRML MDRD: 77 ML/MIN/1.73M2
GLUCOSE BLD-MCNC: 129 MG/DL (ref 70–99)
GLUCOSE BLDC GLUCOMTR-MCNC: 134 MG/DL (ref 70–99)
HCT VFR BLD AUTO: 35.9 % (ref 35–47)
HGB BLD-MCNC: 10.9 G/DL (ref 11.7–15.7)
MAGNESIUM SERPL-MCNC: 2.1 MG/DL (ref 1.6–2.3)
MCH RBC QN AUTO: 25.4 PG (ref 26.5–33)
MCHC RBC AUTO-ENTMCNC: 30.4 G/DL (ref 31.5–36.5)
MCV RBC AUTO: 84 FL (ref 78–100)
PHOSPHATE SERPL-MCNC: 2.9 MG/DL (ref 2.5–4.5)
PLATELET # BLD AUTO: 274 10E3/UL (ref 150–450)
POTASSIUM BLD-SCNC: 2.9 MMOL/L (ref 3.4–5.3)
POTASSIUM BLD-SCNC: 3.3 MMOL/L (ref 3.4–5.3)
PROT SERPL-MCNC: 6.3 G/DL (ref 6.8–8.8)
RBC # BLD AUTO: 4.29 10E6/UL (ref 3.8–5.2)
SODIUM SERPL-SCNC: 148 MMOL/L (ref 133–144)
WBC # BLD AUTO: 8.3 10E3/UL (ref 4–11)

## 2022-08-31 PROCEDURE — 93010 ELECTROCARDIOGRAM REPORT: CPT | Performed by: INTERNAL MEDICINE

## 2022-08-31 PROCEDURE — 84100 ASSAY OF PHOSPHORUS: CPT | Performed by: INTERNAL MEDICINE

## 2022-08-31 PROCEDURE — 80053 COMPREHEN METABOLIC PANEL: CPT | Performed by: INTERNAL MEDICINE

## 2022-08-31 PROCEDURE — 85018 HEMOGLOBIN: CPT | Performed by: INTERNAL MEDICINE

## 2022-08-31 PROCEDURE — 999N000157 HC STATISTIC RCP TIME EA 10 MIN

## 2022-08-31 PROCEDURE — 99291 CRITICAL CARE FIRST HOUR: CPT | Performed by: INTERNAL MEDICINE

## 2022-08-31 PROCEDURE — 200N000001 HC R&B ICU

## 2022-08-31 PROCEDURE — 36415 COLL VENOUS BLD VENIPUNCTURE: CPT | Performed by: INTERNAL MEDICINE

## 2022-08-31 PROCEDURE — 250N000011 HC RX IP 250 OP 636: Performed by: INTERNAL MEDICINE

## 2022-08-31 PROCEDURE — 94003 VENT MGMT INPAT SUBQ DAY: CPT

## 2022-08-31 PROCEDURE — 87205 SMEAR GRAM STAIN: CPT | Performed by: INTERNAL MEDICINE

## 2022-08-31 PROCEDURE — 99291 CRITICAL CARE FIRST HOUR: CPT | Mod: GC | Performed by: STUDENT IN AN ORGANIZED HEALTH CARE EDUCATION/TRAINING PROGRAM

## 2022-08-31 PROCEDURE — 93005 ELECTROCARDIOGRAM TRACING: CPT

## 2022-08-31 PROCEDURE — 250N000013 HC RX MED GY IP 250 OP 250 PS 637: Performed by: INTERNAL MEDICINE

## 2022-08-31 PROCEDURE — 84132 ASSAY OF SERUM POTASSIUM: CPT | Performed by: INTERNAL MEDICINE

## 2022-08-31 PROCEDURE — 83735 ASSAY OF MAGNESIUM: CPT | Performed by: INTERNAL MEDICINE

## 2022-08-31 RX ORDER — POTASSIUM CHLORIDE 1.5 G/1.58G
20 POWDER, FOR SOLUTION ORAL ONCE
Status: DISCONTINUED | OUTPATIENT
Start: 2022-08-31 | End: 2022-08-31

## 2022-08-31 RX ORDER — POTASSIUM CHLORIDE 7.45 MG/ML
10 INJECTION INTRAVENOUS
Status: DISPENSED | OUTPATIENT
Start: 2022-08-31 | End: 2022-08-31

## 2022-08-31 RX ORDER — POTASSIUM CHLORIDE 1.5 G/1.58G
40 POWDER, FOR SOLUTION ORAL ONCE
Status: COMPLETED | OUTPATIENT
Start: 2022-08-31 | End: 2022-08-31

## 2022-08-31 RX ADMIN — TICAGRELOR 90 MG: 90 TABLET ORAL at 20:05

## 2022-08-31 RX ADMIN — AMPICILLIN SODIUM AND SULBACTAM SODIUM 3 G: 2; 1 INJECTION, POWDER, FOR SOLUTION INTRAMUSCULAR; INTRAVENOUS at 08:00

## 2022-08-31 RX ADMIN — LEVOTHYROXINE SODIUM 175 MCG: 175 TABLET ORAL at 08:00

## 2022-08-31 RX ADMIN — HYDROMORPHONE HYDROCHLORIDE 0.4 MG: 0.2 INJECTION, SOLUTION INTRAMUSCULAR; INTRAVENOUS; SUBCUTANEOUS at 04:35

## 2022-08-31 RX ADMIN — PROPOFOL 50 MCG/KG/MIN: 10 INJECTION, EMULSION INTRAVENOUS at 00:12

## 2022-08-31 RX ADMIN — AMPICILLIN SODIUM AND SULBACTAM SODIUM 3 G: 2; 1 INJECTION, POWDER, FOR SOLUTION INTRAMUSCULAR; INTRAVENOUS at 15:03

## 2022-08-31 RX ADMIN — PROPOFOL 50 MCG/KG/MIN: 10 INJECTION, EMULSION INTRAVENOUS at 04:14

## 2022-08-31 RX ADMIN — POTASSIUM CHLORIDE 40 MEQ: 1.5 POWDER, FOR SOLUTION ORAL at 08:01

## 2022-08-31 RX ADMIN — PROPOFOL 50 MCG/KG/MIN: 10 INJECTION, EMULSION INTRAVENOUS at 08:01

## 2022-08-31 RX ADMIN — CHLORHEXIDINE GLUCONATE 15 ML: 1.2 SOLUTION ORAL at 08:00

## 2022-08-31 RX ADMIN — POTASSIUM CHLORIDE 10 MEQ: 7.46 INJECTION, SOLUTION INTRAVENOUS at 12:55

## 2022-08-31 RX ADMIN — Medication 40 MG: at 08:00

## 2022-08-31 RX ADMIN — AMPICILLIN SODIUM AND SULBACTAM SODIUM 3 G: 2; 1 INJECTION, POWDER, FOR SOLUTION INTRAMUSCULAR; INTRAVENOUS at 02:23

## 2022-08-31 RX ADMIN — AMPICILLIN SODIUM AND SULBACTAM SODIUM 3 G: 2; 1 INJECTION, POWDER, FOR SOLUTION INTRAMUSCULAR; INTRAVENOUS at 20:04

## 2022-08-31 ASSESSMENT — ACTIVITIES OF DAILY LIVING (ADL)
ADLS_ACUITY_SCORE: 41

## 2022-08-31 NOTE — PROGRESS NOTES
Pt extubated to 2L NC per MD order.  LS are diminished t/o and no stridor noted.  Will continue to follow  Jesus Urena, RT  8/31/2022

## 2022-08-31 NOTE — PROGRESS NOTES
I called and reported the possible tricyclic overdose to [place] poison control. Given that she has been here for over 12 hours, not experiencing dysrhythmia or tachycardia and amitriptyline is the probable offending agent, we are likely out of the window of severe toxicity. ECG showed  --> 120 at 12:52 today.    Plan  Repeat ECG, if QRS is above 120 administer 2 amps bicarb, if that improves QRS then plan to initiate bicarbonate infusion at 100-150ml/ hr

## 2022-08-31 NOTE — PROGRESS NOTES
"Critical Care Progress Note      08/31/2022    Name: Kylah Gutierrez MRN#: [id number]   Age: 57 year old YOB: 1965     Hsptl Day# 1  ICU DAY #    MV DAY #             Problem List:   Active Problems:    Expressive aphasia           Summary/Hospital Course:   Admitted 8/30/22 from [facility] ED after presenting there from work after colleagues reported confusion and speech abnormality. Per paper chart review, she reported to a nurse that she \"took a bunch of drugs\". She was agitated and ultimately sedated and intubated in order to obtain head CT/CTA, which was negative. She was transferred here where MRI was negative.     Extubated today (8/31), hemodynamically stable, on room air, non-focal neuro exam.       Assessment and plan :     Kylah Gutierrez is a 57 year old woman with PMH significant for HTN, chronic intracranial hypertension, CAD s/p PCI of RCA 12/2021 who presented to an outside ED 8/30/22 with confusion and agitation, non-focal neuro exam, negative CT/CTA.. MRI here negative for stroke. DDx includes amitriptyline toxicity vs less likely TIA/intracranial hypertension. She is stable and improving, extubated 8/31.     I have personally reviewed the daily labs, imaging studies, cultures and discussed the case with referring physician and consulting physicians.      Neuro:   #Encephalopathy  #Hx chronic intracranial hypertension causing papilledema  #Depression  #Possible amitriptyline toxicity  Patient now extubated, alert, oriented and appropriately interactive. Exam remains non-focal. QRS continues to improve; now 118ms from 120 (138 on admission) (per care everywhere last EKG 2019 with QRS 108ms).   -Neuro crit consulted, appreciate recs  -Holding PTA amitriptyline. Levels drawn and pending.      Cardiac:   #CAD s/p NSTEMI in 12/2021 with PCI to mid-RCA   #Severe aortic stenosis  Echo here with progression of her aortic stenosis since prior on 12/2/2021 with mean gradient 50mmHg (from 32) and " valve area 0.87cm2 (from 1.5). EKG performed here and no ischemic changes and troponins have been negative.     -Continue PTA metoprolol as able  -Restart PTA aspirin 81 mg and ticagrelor      Pulm:   Extubated today and now protecting airway. Maintaining sats on room air.   - continue to monitor.      Renal:   #Hx of CKD3 per chart review  #Mild non-anion gap metabolic acidosis.   #Hypokalemia  #Hypernatremia  Creatinine is normal and stable and she is making good urine via Palafox. K 2.9 from 3.9. Sodium 148 from 141. Suspect mild dehydration.  -Potassium repletion IV; per protocol  -Advance oral intake pending swallow eval  -Monitor urine output, I's/O's  -Avoid nephrotoxins  -Daily BMP     GI:   #GERD  Patient was on Zantac and Prilosec PTA.  -Continue Protonix.      ID:   #Possible aspiration pneumonia  Chest x-ray with left lung infiltrate, moderate secretions.  Pro-Greg 0.12.  High risk for aspiration.     Unasyn.  -Blood and sputum cultures ordered     Heme:   #Normocytic anemia  Hgb improved at 10.9 from 10.7. No signs of bleeding.  Last hemoglobin in Care Everywhere was 12.5 but was 2 years ago.  Will monitor.     Endo:   #Hypothyroidism  Blood glucose levels are acceptable. TSH mildly low at 0.13 but free T4 normal at 0.98. Continue monitoring per ICU protocol.  - continue PTA levothyroxine      ICU:   DVT ppx: heparin (hold at this time pending neuro plan)  GI ppx: Protonix  Lines: ETT, palafox, OG  Code: Full  Family:         Key goals for next 24 hours:   1.  2.  3.               Interim History:   Extubated this morning. Reports that she does not remember presenting to outside ED. Asks if she was at work prior to that. States she does not recall anything from that day. She is without pain anywhere. No numbness or tingling. Has some throat irritation and dry mouth.          Key Medications:       ampicillin-sulbactam  3 g Intravenous Q6H     aspirin  81 mg Per G Tube Daily     chlorhexidine  15 mL  Mouth/Throat Q12H     levothyroxine  175 mcg Oral or Feeding Tube QAM AC     pantoprazole  40 mg Per Feeding Tube QAM AC    Or     pantoprazole  40 mg Intravenous QAM AC     potassium chloride  10 mEq Intravenous Q1H     ticagrelor  90 mg Oral BID       propofol 25 mcg/kg/min (08/31/22 0940)     sodium chloride 10 mL/hr at 08/30/22 1200               Physical Examination:   Temp:  [97.5  F (36.4  C)-99.7  F (37.6  C)] 99.5  F (37.5  C)  Pulse:  [65-95] 85  Resp:  [19-29] 29  BP: (112-189)/(64-95) 147/79  FiO2 (%):  [30 %] 30 %  SpO2:  [95 %-100 %] 100 %    Intake/Output Summary (Last 24 hours) at 8/31/2022 1148  Last data filed at 8/31/2022 0800  Gross per 24 hour   Intake 1280.6 ml   Output 1070 ml   Net 210.6 ml     Wt Readings from Last 4 Encounters:   08/31/22 82.3 kg (181 lb 7 oz)     BP - Mean:  [] 127  Vent Mode: CMV/AC  (Continuous Mandatory Ventilation/ Assist Control)  FiO2 (%): 30 %  Resp Rate (Set): 20 breaths/min  Tidal Volume (Set, mL): 460 mL  PEEP (cm H2O): 5 cmH2O  Resp: 29    Recent Labs   Lab 08/30/22  1116   O2PER 0       GEN: no acute distress   HEENT: head ncat, sclera anicteric, OP patent, trachea midline. Hoarse voice.   PULM: breathing comfortably on room air. No distress. Lungs clear bilaterally without crackles, wheezing, or rhonchi.   CV/COR: RRR. Loud systolic murmur, unchanged from yesterday.   ABD: soft, nontender, non-distended  EXT:  No edema bilaterally. Warm and well perfused  NEURO: Alert, appropriately interactive. Follow commands. Strength is equal to upper and lower extremities bilaterally. Globally, moderately weak but able to lift legs and arms against gravity with some difficulty. Again, no focal weakness. Face symmetric and no dysarthria.   Psych: Intermittently tearfully. Appears anxious.    SKIN: no obvious rash  LINES: clean, dry intact         Data:   All data and imaging reviewed     ROUTINE ICU LABS (Last four results)  CMP  Recent Labs   Lab 08/31/22  9476  08/31/22  0007 08/30/22  1731 08/30/22  1305 08/30/22  0838 08/30/22  0555   *  --   --   --   --  141   POTASSIUM 2.9*  --   --   --   --  3.9   CHLORIDE 118*  --   --   --   --  115*   CO2 22  --   --   --   --  19*   ANIONGAP 8  --   --   --   --  7   * 134* 79 89   < > 150*   BUN 10  --   --   --   --  12   CR 0.88  --   --   --   --  0.90   GFRESTIMATED 77  --   --   --   --  75   LIZA 8.5  --   --   --   --  8.3*   MAG 2.1  --   --   --   --  2.2   PHOS 2.9  --   --   --   --  3.3   PROTTOTAL 6.3*  --   --   --   --  6.4*   ALBUMIN 3.1*  --   --   --   --  3.0*   BILITOTAL 0.2  --   --   --   --  0.1*   ALKPHOS 83  --   --   --   --  82   AST 12  --   --   --   --  12   ALT 21  --   --   --   --  22    < > = values in this interval not displayed.     CBC  Recent Labs   Lab 08/31/22  0542 08/30/22  0555   WBC 8.3 8.0   RBC 4.29 4.31   HGB 10.9* 10.7*   HCT 35.9 37.0   MCV 84 86   MCH 25.4* 24.8*   MCHC 30.4* 28.9*   RDW 15.4* 15.1*    292     INR  Recent Labs   Lab 08/30/22  0555   INR 1.04     Arterial Blood Gas  Recent Labs   Lab 08/30/22  1116   O2PER 0       All cultures:  No results for input(s): CULT in the last 168 hours.  Recent Results (from the past 24 hour(s))   XR Abdomen Port 1 View    Narrative    XR ABDOMEN PORT 1 VIEW   8/30/2022 12:56 PM     HISTORY: MRI screening - looking for metal to verify safe to do MRI  scan    COMPARISON: None.      Impression    IMPRESSION: Partially visualized endotracheal tube with tip 3 cm above  the heath. Nasogastric tube tip and sidehole overlying the stomach.  Temperature probe overlying the bladder. No additional radiopaque  foreign body. No evidence of bowel obstruction. Lung bases are clear.  No acute bony abnormality.    VERONICA HUDSON MD         SYSTEM ID:  [id number]   Echocardiogram Complete   Result Value    LVEF  60-65%    Narrative    [id number]  [id number]  [id number]  [id number]^HOMERO^DOMINICK^GUILLERMO     [facility]  [facility]  Echocardiography Laboratory  [address] [place] [address]     Name: FARHAT COSME  MRN: [id number]  : 1965  Study Date: 2022 02:29 PM  Age: 57 yrs  Gender: Female  Patient Location: [facility]  Reason For Study: Aortic Valve Disorder  Ordering Physician: DOMINICK VALENTIN  Referring Physician: Ronna Jena  Performed By: Marisol Vieira     BSA: 1.9 m2  Height: 66 in  Weight: 181 lb  HR: 73  BP: 172/83 mmHg  ______________________________________________________________________________  Procedure  Complete Portable Echo Adult. Optison (NDC #[id number]) given intravenously.  ______________________________________________________________________________  Interpretation Summary     Left ventricular systolic function is normal. The visual ejection fraction is  60-65%.  The right ventricular systolic function is normal.  Severe valvular aortic stenosis- mean gradients 50 mm hg, ANTONIO 0.87 cm2.  There is systolic anterior motion of the chordal apparatus.There is mild (1+)  mitral regurgitation.     No prior study for comparison.     ______________________________________________________________________________  Left Ventricle  The left ventricle is normal in size. There is normal left ventricular wall  thickness. Left ventricular systolic function is normal. The visual ejection  fraction is 60-65%. Diastolic Doppler findings (E/E' ratio and/or other  parameters) suggest left ventricular filling pressures are indeterminate.  Septal motion is consistent with conduction abnormality.     Right Ventricle  The right ventricle is normal size. The right ventricular systolic function is  normal.     Atria  The left atrium is mildly dilated. Right atrial size is normal. There is no  color Doppler evidence of an atrial shunt.     Mitral Valve  There is systolic anterior motion of the chordal apparatus. There is mild (1+)  mitral regurgitation. There is no mitral valve stenosis.     Tricuspid  Valve  There is trace tricuspid regurgitation. Right ventricular systolic pressure  could not be approximated due to inadequate tricuspid regurgitation.     Aortic Valve  There is mild (1+) aortic regurgitation. Severe valvular aortic stenosis. The  mean AoV pressure gradient is 50.4 mmHg. The calculated aortic valve are is  0.87 cm^2.     Pulmonic Valve  The pulmonic valve is not well visualized. There is no pulmonic valvular  stenosis.     Vessels  The aortic root is normal size. Normal size ascending aorta.     Pericardium  There is no pericardial effusion.     Rhythm  Sinus rhythm was noted. The patient exhibited occasional PVCs.  ______________________________________________________________________________  MMode/2D Measurements & Calculations  IVSd: 1.1 cm     LVIDd: 4.1 cm  LVIDs: 2.6 cm  LVPWd: 1.2 cm  FS: 37.4 %  LV mass(C)d: 156.1 grams  LV mass(C)dI: 81.4 grams/m2  Ao root diam: 2.7 cm  asc Aorta Diam: 3.2 cm  LVOT diam: 1.7 cm  LVOT area: 2.4 cm2  LA Volume (BP): 43.4 ml  LA Volume Index (BP): 22.6 ml/m2  RWT: 0.57     Doppler Measurements & Calculations  MV E max laci: 75.2 cm/sec  MV A max laci: 99.0 cm/sec  MV E/A: 0.76  MV max P.3 mmHg  MV mean P.3 mmHg  MV V2 VTI: 30.6 cm  MVA(VTI): 3.0 cm2  MV dec slope: 333.6 cm/sec2  MV dec time: 0.23 sec  Ao V2 max: 476.3 cm/sec  Ao max P.7 mmHg  Ao V2 mean: 336.0 cm/sec  Ao mean P.4 mmHg  Ao V2 VTI: 105.3 cm  ANTONIO(I,D): 0.87 cm2  ANTONIO(V,D): 0.95 cm2  AI P1/2t: 348.5 msec  LV V1 max P.5 mmHg  LV V1 max: 190.5 cm/sec  LV V1 VTI: 38.5 cm  SV(LVOT): 91.7 ml  SI(LVOT): 47.8 ml/m2  PA acc time: 0.09 sec  AV Laci Ratio (DI): 0.40  ANTONIO Index (cm2/m2): 0.45  E/E' av.9  Lateral E/e': 5.7  Medial E/e': 14.1     ______________________________________________________________________________  Report approved by: Olga Flores 2022 04:01 PM         MR Brain w/o & w Contrast    Narrative    EXAM: MR BRAIN W/O and W CONTRAST  LOCATION: [facility]  DATE/TIME: 8/30/2022 5:06 PM    INDICATION: AMS, expressive aphasia  COMPARISON: 8/4/2017  CONTRAST: 8mL Gadavist  TECHNIQUE: Routine multiplanar multisequence head MRI without and with intravenous contrast.    FINDINGS:  INTRACRANIAL CONTENTS: No acute or subacute infarct. No mass, acute hemorrhage, or extra-axial fluid collections. Single punctate focus of T2/FLAIR hyperintensity in the left frontal white matter. Normal ventricles and sulci. Normal position of the   cerebellar tonsils. No pathologic contrast enhancement.    SELLA: Empty sella morphology with flattening of the pituitary gland along the sellar floor.    OSSEOUS STRUCTURES/SOFT TISSUES: Normal marrow signal. The major intracranial vascular flow voids are maintained.     ORBITS: Slight distention of the optic nerve sheaths.     SINUSES/MASTOIDS: No paranasal sinus mucosal disease. No middle ear or mastoid effusion.       Impression    IMPRESSION:  1.  No acute findings.  2.  Again seen is partial empty sella with slight distention of the optic nerve sheaths. Correlate for symptoms of idiopathic intracranial hypertension.         Billing: This patient is critically ill: Yes. Total critical care time today 35 min.    Attending Intensivist Note  I fully assessed patient myself including interval history, physical exam myself at bedside, and review of data. I also discussed case with Yamil Richardson and am grateful for his assistance and fine care.     Lungs were clear; heart RRR; abdomen soft and non-tender; extremities warm with minimal edema; skin shows no cyanosis or mottling; CNS moving all extremities but following only some commands.     Her EKG today showed a qRS that had narrowed down to .18 msec compared to much longer yesterday. At this point it is clear that she likely had an toxic dose of a TCA which is now finally resolving. Her neuro work up is negative.     We assessed her on ventilator this morning, and she  was able to tolerate only a short breathing trial but did well on it and was successfully extubated. Given her only slowly resolving delirium we will have a formal swallow assessment.     She will need follow up by psych in AM (ordered) for possible intentional over dose. If cultures negative will stop Unasyn tomorrow.     I spent 30 minutes of critical care time with her today assessing and managing both her delirium, TCA overdose, and mechanical ventilator.    lucila romero  August 31, 2022

## 2022-08-31 NOTE — PLAN OF CARE
Neuro: LYONS. PERRL. Does not follow.   Cardiac: SR + BBB. SBP < 180  Pulmonary: ETT. 22 @ lips. Lots of secretions in mouth and inline.   GI: OG clamped. No bm overnight.   : Alexis. Adequate output.   Skin: Scattered bruising. Pale.   Lines: PIV 2x.   Drips: Propofol, TKO  Activity: Bedrest  Restraints: BUE expires 0720.

## 2022-08-31 NOTE — PLAN OF CARE
Pt A&O, PERRLA, denies pain. SBP elevated at times. Initial bedside swallow pt was coughing, tried again this afternoon and pt tolerated well. Pt denies SI or taking any medication with the intent to harm herself. Pt's daughter verified. 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 May have accidentally taken medication forgetting that she had already taken. Urine output minimal but adequate, darker in color. Alexis can be removed this evening. Neuro not wanting to do LP at this time. Plan for possible transfer out of ICU.

## 2022-08-31 NOTE — PROGRESS NOTES
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  Patient was seen in  An OSH where she was found to be agitated and combative, she was able to follow commands and had no focal deficit. She had to be intubated to have CT/CTA done which were unremarkable as per chart.  Urine tox screen was only +ve for amitriptyline and Benzo  No LKWT or onset known.   History is unobtainable from the patieNT       Impression   Altered mental state- completely resolved  Likely  2/2 amitryptyline overdose    Plan  Neurology team will sign off- please re consult if concerns about patient recovery   HOB>30  Goal BP is normotension  Patient to follow her neurologist for IIH   Follow Amitryptyline levels/.    Patient Follow-up      ______________________________________________________    Clinically Significant Risk Factors Present on Admission                 Medications   Scheduled Meds    ampicillin-sulbactam  3 g Intravenous Q6H     aspirin  81 mg Per G Tube Daily     chlorhexidine  15 mL Mouth/Throat Q12H     levothyroxine  175 mcg Oral or Feeding Tube QAM AC     pantoprazole  40 mg Per Feeding Tube QAM AC    Or     pantoprazole  40 mg Intravenous QAM AC     potassium chloride  10 mEq Intravenous Q1H     ticagrelor  90 mg Oral BID       Infusion Meds    propofol 25 mcg/kg/min (08/31/22 0940)     sodium chloride 10 mL/hr at 08/30/22 1200       PRN Meds  albuterol, glucose  **OR** dextrose **OR** glucagon, HYDROmorphone, naloxone **OR** naloxone **OR** naloxone **OR** naloxone       PHYSICAL EXAMINATION  Temp:  [97.5  F (36.4  C)-99.7  F (37.6  C)] 99.5  F (37.5  C)  Pulse:  [65-95] 85  Resp:  [19-29] 29  BP: (112-189)/(64-95) 147/79  FiO2 (%):  [30 %] 30 %  SpO2:  [95 %-100 %] 100 %      Alert, oriented and  conversant  Pupils equal and reactive reactive   Moving all limbs with normal power, sensation and coordination  Systolic murmur all over the precordium  Chest clear  Abdomen soft  Calves soft non tender    Stroke Scales    NIHSS  1a. Level of Consciousness 3-->Responds only with reflex motor or autonomic effects or totally unresponsive, flaccid, and areflexic   1b. LOC Questions 2-->Answers neither question correctly   1c. LOC Commands 2-->Performs neither task correctly   2.   Best Gaze 0-->Normal   3.   Visual 0-->No visual loss (UNABLE TO ASSESS)   4.   Facial Palsy 0-->Normal symmetrical movements   5a. Motor Arm, Left 3-->No effort against gravity, limb falls   5b. Motor Arm, Right 3-->No effort against gravity, limb falls   6a. Motor Leg, Left 3-->No effort against gravity, leg falls to bed immediately   6b. Motor Leg, right 3-->No effort against gravity, leg falls to bed immediately   7.   Limb Ataxia 0-->Absent (UNABLE TO ASSESS)   8.   Sensory 0-->Normal, no sensory loss   9.   Best Language 3-->Mute, global aphasia, no usable speech or auditory comprehension   10. Dysarthria (UN) Intubated or other physical barrier   11. Extinction and Inattention  0-->No abnormality   Total 22 (08/30/22 0914)           Imaging  I personally reviewed all imaging; relevant findings per HPI.     Lab Results Data   CBC  Recent Labs   Lab 08/31/22  0542 08/30/22  0555   WBC 8.3 8.0   RBC 4.29 4.31   HGB 10.9* 10.7*   HCT 35.9 37.0    292     Basic Metabolic Panel    Recent Labs   Lab 08/31/22  0542 08/31/22  0007 08/30/22  1731 08/30/22  0838 08/30/22  0555   *  --   --   --   141   POTASSIUM 2.9*  --   --   --  3.9   CHLORIDE 118*  --   --   --  115*   CO2 22  --   --   --  19*   BUN 10  --   --   --  12   CR 0.88  --   --   --  0.90   * 134* 79   < > 150*   LIZA 8.5  --   --   --  8.3*    < > = values in this interval not displayed.     Liver Panel  Recent Labs   Lab 08/31/22  0542 08/30/22  0555   PROTTOTAL 6.3* 6.4*   ALBUMIN 3.1* 3.0*   BILITOTAL 0.2 0.1*   ALKPHOS 83 82   AST 12 12   ALT 21 22     INR    Recent Labs   Lab Test 08/30/22  0555   INR 1.04      Lipid Profile  No lab results found.  A1C  No lab results found.  Troponin    Recent Labs   Lab 08/30/22  0555   TROPONINIS 9

## 2022-08-31 NOTE — PROGRESS NOTES
[facility] ICU RESPIRATORY NOTE           Date of Admission: 08/30/2022     Date of Intubation (most recent): 08/30/2022(Intubated at OSH)    Reason for Mechanical Ventilation: 1     Number of Days on Mechanical Ventilation: 1     Met Criteria for Spontaneous Breathing Trial: NO     Significant event today :None     ABG Results:   Recent Labs   Lab 08/30/22  1116   O2PER 0       Current Vent Settings:  Vent Mode: CMV/AC  (Continuous Mandatory Ventilation/ Assist Control)  FiO2 (%): 30 %  Resp Rate (Set): 20 breaths/min  Tidal Volume (Set, mL): 460 mL  PEEP (cm H2O): 5 cmH2O  Resp: 23        Skin Assessment :Dry and intact      Plan: Continue full ventilatory support and wean as tolerated.     Tanesha Powell, RT

## 2022-08-31 NOTE — PHARMACY-ADMISSION MEDICATION HISTORY
Pharmacy Medication History  Admission medication history interview status for the 8/30/2022  admission is complete. See EPIC admission navigator for prior to admission medications     Location of Interview: Patient room  Medication history sources: Patient, Surescripts and Care Everywhere    Significant changes made to the medication list:  Removed: symbicort inhaler  Changed: losartan from 100mg to 50mg    In the past week, patient estimated taking medication this percent of the time: greater than 90%      Medication reconciliation completed by provider prior to medication history? No    Time spent in this activity: 15 minutes    Prior to Admission medications    Medication Sig Last Dose Taking? Auth Provider Long Term End Date   acetaZOLAMIDE (DIAMOX SEQUELS) 500 MG 12 hr capsule Take 1 capsule (500 mg) by mouth 2 times daily  Yes Chuck Ochoa MD Yes    albuterol (PROAIR HFA/PROVENTIL HFA/VENTOLIN HFA) 108 (90 Base) MCG/ACT inhaler Inhale 2 puffs into the lungs every 4 hours as needed  Yes Reported, Patient Yes    amitriptyline (ELAVIL) 100 MG tablet Take 200 mg by mouth At Bedtime  Yes Reported, Patient Yes    aspirin 81 MG EC tablet Take 81 mg by mouth daily  Yes Unknown, Entered By History     famotidine (PEPCID) 20 MG tablet Take 20 mg by mouth 2 times daily  Yes Unknown, Entered By History     levothyroxine (SYNTHROID/LEVOTHROID) 175 MCG tablet Take 175 mcg by mouth daily  Yes Reported, Patient Yes    losartan (COZAAR) 50 MG tablet Take 50 mg by mouth daily  Yes Unknown, Entered By History Yes    metoprolol tartrate (LOPRESSOR) 25 MG tablet Take 25 mg by mouth 2 times daily  Yes Unknown, Entered By History Yes    omeprazole (PRILOSEC) 20 MG CR capsule Take 20 mg by mouth 2 times daily  Yes Reported, Patient     rosuvastatin (CRESTOR) 40 MG tablet Take 40 mg by mouth At Bedtime  Yes Unknown, Entered By History Yes    ticagrelor (BRILINTA) 90 MG tablet Take 90 mg by mouth 2 times daily  Yes  Unknown, Entered By History     Vitamin D3 (CHOLECALCIFEROL) 25 mcg (1000 units) tablet Take 1 tablet by mouth daily  Yes Unknown, Entered By History         The information provided in this note is only as accurate as the sources available at the time of update(s)

## 2022-09-01 ENCOUNTER — APPOINTMENT (OUTPATIENT)
Dept: SPEECH THERAPY | Facility: CLINIC | Age: 57
End: 2022-09-01
Attending: INTERNAL MEDICINE
Payer: COMMERCIAL

## 2022-09-01 LAB
ATRIAL RATE - MUSE: 62 BPM
ATRIAL RATE - MUSE: 67 BPM
DIASTOLIC BLOOD PRESSURE - MUSE: NORMAL MMHG
DIASTOLIC BLOOD PRESSURE - MUSE: NORMAL MMHG
INTERPRETATION ECG - MUSE: NORMAL
INTERPRETATION ECG - MUSE: NORMAL
P AXIS - MUSE: 50 DEGREES
P AXIS - MUSE: 66 DEGREES
POTASSIUM BLD-SCNC: 3.5 MMOL/L (ref 3.4–5.3)
PR INTERVAL - MUSE: 162 MS
PR INTERVAL - MUSE: 178 MS
QRS DURATION - MUSE: 120 MS
QRS DURATION - MUSE: 132 MS
QT - MUSE: 442 MS
QT - MUSE: 476 MS
QTC - MUSE: 467 MS
QTC - MUSE: 483 MS
R AXIS - MUSE: 43 DEGREES
R AXIS - MUSE: 71 DEGREES
SYSTOLIC BLOOD PRESSURE - MUSE: NORMAL MMHG
SYSTOLIC BLOOD PRESSURE - MUSE: NORMAL MMHG
T AXIS - MUSE: 76 DEGREES
T AXIS - MUSE: 77 DEGREES
VENTRICULAR RATE- MUSE: 62 BPM
VENTRICULAR RATE- MUSE: 67 BPM

## 2022-09-01 PROCEDURE — 250N000013 HC RX MED GY IP 250 OP 250 PS 637: Performed by: INTERNAL MEDICINE

## 2022-09-01 PROCEDURE — 36415 COLL VENOUS BLD VENIPUNCTURE: CPT | Performed by: INTERNAL MEDICINE

## 2022-09-01 PROCEDURE — 92526 ORAL FUNCTION THERAPY: CPT | Mod: GN | Performed by: SPEECH-LANGUAGE PATHOLOGIST

## 2022-09-01 PROCEDURE — 99222 1ST HOSP IP/OBS MODERATE 55: CPT | Performed by: INTERNAL MEDICINE

## 2022-09-01 PROCEDURE — 120N000001 HC R&B MED SURG/OB

## 2022-09-01 PROCEDURE — 99231 SBSQ HOSP IP/OBS SF/LOW 25: CPT | Performed by: INTERNAL MEDICINE

## 2022-09-01 PROCEDURE — 250N000011 HC RX IP 250 OP 636: Performed by: INTERNAL MEDICINE

## 2022-09-01 PROCEDURE — 84132 ASSAY OF SERUM POTASSIUM: CPT | Performed by: INTERNAL MEDICINE

## 2022-09-01 PROCEDURE — 92610 EVALUATE SWALLOWING FUNCTION: CPT | Mod: GN | Performed by: SPEECH-LANGUAGE PATHOLOGIST

## 2022-09-01 RX ORDER — POTASSIUM CHLORIDE 1.5 G/1.58G
40 POWDER, FOR SOLUTION ORAL ONCE
Status: COMPLETED | OUTPATIENT
Start: 2022-09-01 | End: 2022-09-01

## 2022-09-01 RX ORDER — ROSUVASTATIN CALCIUM 20 MG/1
40 TABLET, COATED ORAL AT BEDTIME
Status: DISCONTINUED | OUTPATIENT
Start: 2022-09-01 | End: 2022-09-02 | Stop reason: HOSPADM

## 2022-09-01 RX ORDER — VITAMIN B COMPLEX
25 TABLET ORAL DAILY
Status: DISCONTINUED | OUTPATIENT
Start: 2022-09-01 | End: 2022-09-02 | Stop reason: HOSPADM

## 2022-09-01 RX ORDER — ASPIRIN 81 MG/1
81 TABLET ORAL DAILY
Status: DISCONTINUED | OUTPATIENT
Start: 2022-09-01 | End: 2022-09-01

## 2022-09-01 RX ORDER — ALBUTEROL SULFATE 90 UG/1
2 AEROSOL, METERED RESPIRATORY (INHALATION) EVERY 4 HOURS PRN
Status: DISCONTINUED | OUTPATIENT
Start: 2022-09-01 | End: 2022-09-02 | Stop reason: HOSPADM

## 2022-09-01 RX ORDER — FAMOTIDINE 20 MG/1
20 TABLET, FILM COATED ORAL 2 TIMES DAILY
Status: DISCONTINUED | OUTPATIENT
Start: 2022-09-01 | End: 2022-09-02 | Stop reason: HOSPADM

## 2022-09-01 RX ORDER — DEXTROSE MONOHYDRATE, SODIUM CHLORIDE, AND POTASSIUM CHLORIDE 50; 1.49; 4.5 G/1000ML; G/1000ML; G/1000ML
INJECTION, SOLUTION INTRAVENOUS CONTINUOUS
Status: DISCONTINUED | OUTPATIENT
Start: 2022-09-01 | End: 2022-09-01

## 2022-09-01 RX ORDER — LOSARTAN POTASSIUM 50 MG/1
50 TABLET ORAL DAILY
Status: DISCONTINUED | OUTPATIENT
Start: 2022-09-01 | End: 2022-09-02 | Stop reason: HOSPADM

## 2022-09-01 RX ORDER — SODIUM CHLORIDE AND POTASSIUM CHLORIDE 150; 450 MG/100ML; MG/100ML
INJECTION, SOLUTION INTRAVENOUS CONTINUOUS
Status: DISCONTINUED | OUTPATIENT
Start: 2022-09-01 | End: 2022-09-02

## 2022-09-01 RX ORDER — METOPROLOL TARTRATE 25 MG/1
25 TABLET, FILM COATED ORAL 2 TIMES DAILY
Status: DISCONTINUED | OUTPATIENT
Start: 2022-09-01 | End: 2022-09-02 | Stop reason: HOSPADM

## 2022-09-01 RX ORDER — POTASSIUM CHLORIDE 1500 MG/1
40 TABLET, EXTENDED RELEASE ORAL ONCE
Status: DISCONTINUED | OUTPATIENT
Start: 2022-09-01 | End: 2022-09-01

## 2022-09-01 RX ORDER — PANTOPRAZOLE SODIUM 40 MG/1
40 TABLET, DELAYED RELEASE ORAL
Status: DISCONTINUED | OUTPATIENT
Start: 2022-09-01 | End: 2022-09-02 | Stop reason: HOSPADM

## 2022-09-01 RX ORDER — HYDRALAZINE HYDROCHLORIDE 20 MG/ML
10 INJECTION INTRAMUSCULAR; INTRAVENOUS EVERY 4 HOURS PRN
Status: DISCONTINUED | OUTPATIENT
Start: 2022-09-01 | End: 2022-09-02 | Stop reason: HOSPADM

## 2022-09-01 RX ADMIN — LOSARTAN POTASSIUM 50 MG: 50 TABLET, FILM COATED ORAL at 15:14

## 2022-09-01 RX ADMIN — FAMOTIDINE 20 MG: 20 TABLET ORAL at 20:31

## 2022-09-01 RX ADMIN — POTASSIUM CHLORIDE 40 MEQ: 1.5 POWDER, FOR SOLUTION ORAL at 09:32

## 2022-09-01 RX ADMIN — LEVOTHYROXINE SODIUM 175 MCG: 175 TABLET ORAL at 08:07

## 2022-09-01 RX ADMIN — ROSUVASTATIN CALCIUM 40 MG: 20 TABLET, FILM COATED ORAL at 21:21

## 2022-09-01 RX ADMIN — AMPICILLIN SODIUM AND SULBACTAM SODIUM 3 G: 2; 1 INJECTION, POWDER, FOR SOLUTION INTRAMUSCULAR; INTRAVENOUS at 03:14

## 2022-09-01 RX ADMIN — ASPIRIN 81 MG CHEWABLE TABLET 81 MG: 81 TABLET CHEWABLE at 08:07

## 2022-09-01 RX ADMIN — Medication 25 MCG: at 15:14

## 2022-09-01 RX ADMIN — TICAGRELOR 90 MG: 90 TABLET ORAL at 08:07

## 2022-09-01 RX ADMIN — AMPICILLIN SODIUM AND SULBACTAM SODIUM 3 G: 2; 1 INJECTION, POWDER, FOR SOLUTION INTRAMUSCULAR; INTRAVENOUS at 20:31

## 2022-09-01 RX ADMIN — METOPROLOL TARTRATE 25 MG: 25 TABLET, FILM COATED ORAL at 20:30

## 2022-09-01 RX ADMIN — AMPICILLIN SODIUM AND SULBACTAM SODIUM 3 G: 2; 1 INJECTION, POWDER, FOR SOLUTION INTRAMUSCULAR; INTRAVENOUS at 08:07

## 2022-09-01 RX ADMIN — TICAGRELOR 90 MG: 90 TABLET ORAL at 20:30

## 2022-09-01 RX ADMIN — AMPICILLIN SODIUM AND SULBACTAM SODIUM 3 G: 2; 1 INJECTION, POWDER, FOR SOLUTION INTRAMUSCULAR; INTRAVENOUS at 13:57

## 2022-09-01 ASSESSMENT — ACTIVITIES OF DAILY LIVING (ADL)
ADLS_ACUITY_SCORE: 41
DEPENDENT_IADLS:: INDEPENDENT
ADLS_ACUITY_SCORE: 41

## 2022-09-01 NOTE — PROGRESS NOTES
"Brief Progress note:    A/P:  Neuro  #Encephalopathy, resolved, thought to be due to inadvertent amitriptyline toxicity   Transferring to medical floor today, no concerns from intensive care perspective. Amitriptyline levels drawn on admission and currently pending.     Cardiovascular  Cardiology consulted for symptomatic, severe aortic stenosis. Pt reports several syncopal episodes over the past year or so - syncopizes every few months, unclear if she has followed up with her cardiologist regarding this since her NSTEMI in Dec 2021.     Echo here shows severe aortic stenosis with ANTONIO 0.87cm2 (from 1.5cm2 on last echo 12/2021). Concerned for need for AVR. Appreciate cardiology involvement and recs.     Infectious disease  All cultures with no growth to date. Feel it would be reasonable to discontinue the Unasyn - this was started on admission for aspiration pneumonia prophylaxis/possible mild aspiration pneumonia. No signs or symptoms to suggest a pneumonia currently.     Subjective:  Patient is feeling much improved. Tolerating oral liquids without difficulty. No pain. No difficulty breathing. Alexis was removed and she is urinating without difficulty. States she did not take more amitriptyline than prescribed as far as she can recall but could have taken it twice (forgetting she already took the first dose). Clarifies that her statement to outside ED nursing \"I took a bunch of drugs\" was likely to tell them that she is on a lot of different medications, though she does not recall making this statement.     Objective:  BP (!) 170/86   Pulse 84   Temp 98.4  F (36.9  C)   Resp 23   Ht 1.626 m (5' 4\")   Wt 82.9 kg (182 lb 12.2 oz)   SpO2 99%   BMI 31.37 kg/m     Physical exam reveals a well-appearing woman, lying reclined in hospital bed. Pleasant, interactive. Appears comfortable. Alert and oriented x4. Loud systolic murmur, consistent with prior exams. Breathing comfortably on room air. Lungs CTAB.     Labs " notable for improved hypokalemia at 3.3 from 2.9 after 60mEq IV K yesterday.     Yamil Richardson, MS4    Attending Intensivist Note  I assessed patient myself and discussed case with Yamil Richardson.     We have requested a cardiology consult at this time as some of her symptoms may be due to moderate to severe aortic stenosis.     lucila romero  September 1, 2022

## 2022-09-01 NOTE — PROGRESS NOTES
Transfer in/out    Transfer to 627 from ICU  Report given to/received from ICU - Sundeep Astudillo    Brief note about patient status upon transfer      Code status: Full Code  Skin: intact - mepilex present to sacrum (preventative)   Fall Risk: Yes- Department fall risk interventions implemented.  Isolation: none  Patient belongings: remain with patient   If patient is a 72 hour hold/Commitment are belongings removed from room and locked up? No  Medication drips upon transfer: none  Sign and held orders released? NA

## 2022-09-01 NOTE — PROGRESS NOTES
SLP Bedside Swallow Evaluation  09/01/22 0852   General Information   Onset of Illness/Injury or Date of Surgery 08/30/22   Referring Physician Dr. Conner   Patient/Family Therapy Goal Statement (SLP) To drink coffee and stop coughing/choking when drinking   Pertinent History of Current Problem Per notes: Kylah Gutierrez is a 57 year old woman with PMH significant for HTN, chronic intracranial hypertension, CAD s/p PCI of RCA 12/2021 who presented to an outside ED 8/30/22 with confusion and agitation, non-focal neuro exam, negative CT/CTA.. MRI here negative for stroke. DDx includes amitriptyline toxicity vs less likely TIA/intracranial hypertension. She is stable and improving, extubated 8/31. Possible aspiration pneumonia. Hx of GERD;      Pt failed RN swallow screen then RN repeated and reported pt passed the screen 8/31.  SLP went to speak with pt on current symptoms and pt was noted to cough/choke with both dry solids and thin liquids.  Full SLP swallow evaluation then initiated.   General Observations Pt was alert and cooperative.  Hoarse weak voicing produced and painful throat reported.  Mild SOB observed during the session.  Pt reports she is having difficulty swallowing.  Pt reports no hx of dysphagia.  Some baseline coughing also noted in conversation.   Oral Motor   Oral Musculature generally intact   Dentition (Oral Motor)   Dentition (Oral Motor) adequate dentition   Vocal Quality/Secretion Management (Oral Motor)   Comment, Vocal Quality/Secretion Management (Oral Motor) Hoarse, weak voicing   General Swallowing Observations   Current Diet/Method of Nutritional Intake (General Swallowing Observations, NIS) regular diet;thin liquids (level 0)   Respiratory Support (General Swallowing Observations) none   Clinical Swallow Evaluation   Feeding Assistance no assistance needed   Clinical Swallow Eval: Thin Liquid Texture Trial   Mode of Presentation, Thin Liquids cup   Volume of Liquid or Food Presented  sips x 3   Oral Phase of Swallow premature pharyngeal entry   Pharyngeal Phase of Swallow reduction in laryngeal movement;repeated swallows  (delay suspected)   Diagnostic Statement overt direct cough after every sip, chin tuck did not eliminate coughing; pain with swallows   Clinical Swallow Eval: Mildly Thick Liquids   Mode of Presentation cup   Volume Presented sips x 3   Oral Phase WFL   Pharyngeal Phase reduction in laryngeal movement;repeated swallows   Diagnostic Statement no signs of aspiration, pt stated she could swallow liquid much better/less pain reported   Clinical Swallow Evaluation: Puree Solid Texture Trial   Mode of Presentation, Puree spoon   Volume of Puree Presented tsps x 3   Oral Phase, Puree WFL   Pharyngeal Phase, Puree reduction in laryngeal movement;repeated swallows   Diagnostic Statement no signs of aspiration, pt stated she could swallow puree much better/less pain reported   Clinical Swallow Eval: Soft & Bite Sized   Mode of Presentation spoon   Volume Presented tsps x 3   Oral Phase WFL   Pharyngeal Phase reduction in laryngeal movement;repeated swallows   Diagnostic Statement slight throat clear, but pt felt she passed bolus after multiple swallows   Clinical Swallow Evaluation: Solid Food Texture Trial   Mode of Presentation self-fed   Volume Presented bites x 2   Oral Phase WFL   Pharyngeal Phase reduction in laryngeal movement;repeated swallows;feeling of something stuck in throat   Diagnostic Statement grimace, struggle to produce multiple swallows, delayed coughing   Esophageal Phase of Swallow   Patient reports or presents with symptoms of esophageal dysphagia Yes   Esophageal comments Hx of GERD   Swallowing Recommendations   Diet Consistency Recommendations soft & bite-sized (level 6);mildly thick liquids (level 2)  (pick smooth soft food)   Supervision Level for Intake close supervision needed   Mode of Delivery Recommendations bolus size, small;no straws;slow rate of intake    Swallowing Maneuver Recommendations alternate food and liquid intake;double dry swallow;effortful (hard) swallow;extra swallow   Monitoring/Assistance Required (Eating/Swallowing) monitor for cough or change in vocal quality with intake   Recommended Feeding/Eating Techniques (Swallow Eval) maintain upright posture during/after eating for 30 minutes   Medication Administration Recommendations, Swallowing (SLP) Small pills and/or crushed pills with puree   Instrumental Assessment Recommendations   (to be determined)   Comment, Swallowing Recommendations Hold po if increased aspiration signs, choking, and/or decreased respiratory status noted   Clinical Impression   Criteria for Skilled Therapeutic Interventions Met (SLP Eval) Yes, treatment indicated   SLP Diagnosis Mod-severe oral-pharyngeal dysphagia   Risks & Benefits of therapy have been explained evaluation/treatment results reviewed;care plan/treatment goals reviewed;current/potential barriers reviewed;risks/benefits reviewed;participants voiced agreement with care plan;participants included;patient   Clinical Impression Comments Pt presents with mod-severe oral-pharyngeal dysphagia at bedside today.  Deficits/risk factors include recent intubation, painful throat and increased pain with swallows, globus sensation, need for multiple swallows, hoarse weak voicing, delayed coughing after dry solids, and overt immediate coughing after all thin liquid swallows.  Recommend a diet downgrade to an IDDSI Diet Level 6, pick smooth foods mostly today, mildly thick liquids by cup, careful use of safe swallow strategies, ok for single ice chips.  Hold po if increased aspiration signs, choking, and/or decreased respiratory status noted.  Plan to continue swallow Tx with ongoing assessment to determine safety for upgrades.   SLP Discharge Planning   SLP Discharge Recommendation home with assist;home with home care speech therapy;Transitional Care Facility   SLP Rationale  for DC Rec Swallow function is well below baseline, deficits may resolve prior to discharge; poor recall noted - assist recommended after discharge   SLP Brief overview of current status  Mod-severe oral-pharyngeal dysphagia.  Rec: IDDSI Diet Level 6, pick smooth foods mostly today, mildly thick liquids by cup, careful use of safe swallow strategies, ok for single ice chips.  Hold po if increased aspiration signs, choking, and/or decreased respiratory status noted    Total Evaluation Time   Total Evaluation Time (Minutes) 15

## 2022-09-01 NOTE — PLAN OF CARE
Pt. In room air, clear lungs ,good perfusion, voiding and tooling, good PO intake, some coughing noted, speech recommended thickened liquid and level 6 diet until vocal chords heal, pt. Alert and oriented but needed reminding at times, pt. Pleasant and cooperative, transported in wheelchair to 6th floor accompanied by CRn and nurses aid without event.  All personal belongings present and room double checked.

## 2022-09-01 NOTE — PLAN OF CARE
Transfer orders in. Medical bed.     Neuro: No neuro deficits. AO4. PERRLA.   Cardiac: SR + BBB. SBP < 180  Pulmonary: RA   GI: No bm overnight.   : Alexis pulled.DTV  Skin: Scattered bruising. Pale.   Lines: PIV 2x.   Activity: Activity as tolerated.  Restraints: NA

## 2022-09-01 NOTE — CONSULTS
"Care Management Initial Consult    General Information  Assessment completed with: Patient,    Type of CM/SW Visit: Initial Assessment    Primary Care Provider verified and updated as needed:     Readmission within the last 30 days:        Reason for Consult: discharge planning  Advance Care Planning: Advance Care Planning Reviewed: no concerns identified          Communication Assessment  Patient's communication style: spoken language (English or Bilingual)             Cognitive  Cognitive/Neuro/Behavioral: WDL  Level of Consciousness: alert  Arousal Level: opens eyes spontaneously  Orientation: oriented x 4  Mood/Behavior: calm  Best Language: 0 - No aphasia  Speech: hoarse    Living Environment:   People in home: parent(s)    Current living Arrangements: house      Able to return to prior arrangements: yes       Family/Social Support:  Care provided by: self  Provides care for: parent(s)   Patient's mom lives with her. 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 Per Kylah, a SW assisted to have her mom stay at a NH, and her dog was put in boarding.   Marital Status: Single  Children          Description of Support System: Supportive    Support Assessment: Limited social contact and support  Kylah states, \"I don't know anybody.\" She could not identify anybody from work/personal life who could help with a ride or bring her belongings to her.     Current Resources:   Patient receiving home care services: No     Community Resources: None  Equipment currently used at home: none  Supplies currently used at home: None    Employment/Financial:  Employment Status: employed full-time        Financial Concerns: No concerns identified   Referral to Financial Worker: Yes       Lifestyle & Psychosocial Needs:  Social Determinants of Health     Tobacco Use: Not on file   Alcohol Use: Not on file   Financial Resource Strain: Not on file   Food Insecurity: Not on file   Transportation Needs: Not on file "   Physical Activity: Not on file   Stress: Not on file   Social Connections: Not on file   Intimate Partner Violence: Not on file   Depression: Not on file   Housing Stability: Not on file       Functional Status:  Prior to admission patient needed assistance:   Dependent ADLs:: Independent  Dependent IADLs:: Independent       Mental Health Status:  Mental Health Status: No Current Concerns       Chemical Dependency Status:  Chemical Dependency Status: No Current Concerns             Values/Beliefs:  Spiritual, Cultural Beliefs, Moravian Practices, Values that affect care: no               Additional Information:  Writer met with patient at the bedside. Introduced self and role. Kylah is A&Ox4 and agreeable to talking about discharge. Kylah expresses that her main concern is how she will get home. Unfortunately she does not have any personal belongings here, as they were left at her work. She lives in [place], [place] which is [place]5 hours from [facility]. She reports that she doesn't know anyone well enough to ask them for a ride. Writer is looking into transportation options.     Mai Hall RN

## 2022-09-02 ENCOUNTER — APPOINTMENT (OUTPATIENT)
Dept: SPEECH THERAPY | Facility: CLINIC | Age: 57
End: 2022-09-02
Attending: INTERNAL MEDICINE
Payer: COMMERCIAL

## 2022-09-02 VITALS
HEIGHT: 64 IN | TEMPERATURE: 97.6 F | BODY MASS INDEX: 31.2 KG/M2 | OXYGEN SATURATION: 97 % | WEIGHT: 182.76 LBS | SYSTOLIC BLOOD PRESSURE: 176 MMHG | HEART RATE: 68 BPM | DIASTOLIC BLOOD PRESSURE: 98 MMHG | RESPIRATION RATE: 16 BRPM

## 2022-09-02 LAB
AMITRIP SERPL-MCNC: 237 NG/ML
AMITRIP+NOR SERPL-MCNC: 343 NG/ML
ATRIAL RATE - MUSE: 81 BPM
ATRIAL RATE - MUSE: 91 BPM
BACTERIA ASPIRATE CULT: NO GROWTH
DIASTOLIC BLOOD PRESSURE - MUSE: NORMAL MMHG
DIASTOLIC BLOOD PRESSURE - MUSE: NORMAL MMHG
GRAM STAIN RESULT: NORMAL
GRAM STAIN RESULT: NORMAL
INTERPRETATION ECG - MUSE: NORMAL
INTERPRETATION ECG - MUSE: NORMAL
NORTRIP SERPL-MCNC: 106 NG/ML
P AXIS - MUSE: 51 DEGREES
P AXIS - MUSE: 52 DEGREES
PR INTERVAL - MUSE: 152 MS
PR INTERVAL - MUSE: 158 MS
QRS DURATION - MUSE: 118 MS
QRS DURATION - MUSE: 122 MS
QT - MUSE: 376 MS
QT - MUSE: 398 MS
QTC - MUSE: 462 MS
QTC - MUSE: 462 MS
R AXIS - MUSE: 25 DEGREES
R AXIS - MUSE: 51 DEGREES
SYSTOLIC BLOOD PRESSURE - MUSE: NORMAL MMHG
SYSTOLIC BLOOD PRESSURE - MUSE: NORMAL MMHG
T AXIS - MUSE: 16 DEGREES
T AXIS - MUSE: 94 DEGREES
VENTRICULAR RATE- MUSE: 81 BPM
VENTRICULAR RATE- MUSE: 91 BPM

## 2022-09-02 PROCEDURE — 99207 PR SC NO CHARGE VISIT: CPT | Performed by: INTERNAL MEDICINE

## 2022-09-02 PROCEDURE — 99222 1ST HOSP IP/OBS MODERATE 55: CPT | Performed by: INTERNAL MEDICINE

## 2022-09-02 PROCEDURE — 250N000013 HC RX MED GY IP 250 OP 250 PS 637: Performed by: INTERNAL MEDICINE

## 2022-09-02 PROCEDURE — 250N000011 HC RX IP 250 OP 636: Performed by: INTERNAL MEDICINE

## 2022-09-02 PROCEDURE — 92526 ORAL FUNCTION THERAPY: CPT | Mod: GN

## 2022-09-02 PROCEDURE — 99239 HOSP IP/OBS DSCHRG MGMT >30: CPT | Performed by: HOSPITALIST

## 2022-09-02 RX ORDER — ASPIRIN 81 MG/1
81 TABLET, CHEWABLE ORAL DAILY
Status: DISCONTINUED | OUTPATIENT
Start: 2022-09-03 | End: 2022-09-02 | Stop reason: HOSPADM

## 2022-09-02 RX ADMIN — Medication 25 MCG: at 08:06

## 2022-09-02 RX ADMIN — LOSARTAN POTASSIUM 50 MG: 50 TABLET, FILM COATED ORAL at 08:06

## 2022-09-02 RX ADMIN — FAMOTIDINE 20 MG: 20 TABLET ORAL at 08:06

## 2022-09-02 RX ADMIN — TICAGRELOR 90 MG: 90 TABLET ORAL at 08:07

## 2022-09-02 RX ADMIN — POTASSIUM CHLORIDE AND SODIUM CHLORIDE: 450; 150 INJECTION, SOLUTION INTRAVENOUS at 00:55

## 2022-09-02 RX ADMIN — ASPIRIN 81 MG CHEWABLE TABLET 81 MG: 81 TABLET CHEWABLE at 08:07

## 2022-09-02 RX ADMIN — METOPROLOL TARTRATE 25 MG: 25 TABLET, FILM COATED ORAL at 08:07

## 2022-09-02 RX ADMIN — PANTOPRAZOLE SODIUM 40 MG: 40 TABLET, DELAYED RELEASE ORAL at 06:43

## 2022-09-02 RX ADMIN — LEVOTHYROXINE SODIUM 175 MCG: 75 TABLET ORAL at 06:43

## 2022-09-02 RX ADMIN — POTASSIUM CHLORIDE AND SODIUM CHLORIDE: 450; 150 INJECTION, SOLUTION INTRAVENOUS at 12:36

## 2022-09-02 ASSESSMENT — ACTIVITIES OF DAILY LIVING (ADL)
ADLS_ACUITY_SCORE: 37
ADLS_ACUITY_SCORE: 37
ADLS_ACUITY_SCORE: 41
ADLS_ACUITY_SCORE: 37
ADLS_ACUITY_SCORE: 41

## 2022-09-02 NOTE — PLAN OF CARE
Goal Outcome Evaluation:      DATE & TIME: 09/02/2022 [id number]                Cognitive Concerns/ Orientation : Alert/Oriented x 4;Voice remains hoarse from intubation that was discontinued on 08/31  BEHAVIOR & AGGRESSION TOOL COLOR: Green  ABNL VS/O2: /86 otherwise VSS, room air  MOBILITY: Assist-1 gait belt  PAIN MANAGMENT: Denies   DIET: Regular with mildly thick liquids (Level 2), no straws  BOWEL/BLADDER:continent  ABNL LAB/BG: WNL;   DRAIN/DEVICES: New  PIV infusing 1/2 normal saline + 20 mEq Potassium at 75 mL/hour  TELEMETRY RHYTHM: Normal sinus rhythm  SKIN: Intact  TESTS/PROCEDURES: Cardiology consulted  D/C DATE: Pending  OTHER IMPORTANT INFO: takes pills whole with thickened   liquids.

## 2022-09-02 NOTE — PROGRESS NOTES
Care Management Discharge Note    Discharge Date: 09/02/2022       Discharge Disposition: Home    Discharge Services: Transportation Services    Discharge DME: None    Discharge Transportation: agency    Private pay costs discussed: Not applicable    PAS Confirmation Code:    Patient/family educated on Medicare website which has current facility and service quality ratings:      Education Provided on the Discharge Plan:    Persons Notified of Discharge Plans: None  Patient/Family in Agreement with the Plan: yes      Additional Information:   Patient discharging today. Account [facility]ann[facility] [phone] contacted and they are able to take patient to her home in [facility]. Patient in agreement with plan.         Blanca Randhawa RN

## 2022-09-02 NOTE — CONSULTS
57 year old admitted on 8/30 with AMS and agitation who required intubation for imaging and work up thought to be TCA overdose, now extubated and back to baseline mental status, no evidence of pneumonia but worsening Aortic stenosis pending cardiology evaluation.    Dictation # [id number]      Foster Bartlett MD

## 2022-09-02 NOTE — CONSULTS
[facility]    Cardiology Consultation     Date of Admission: 08/30/2022  Service Date: 09/02/2022    Summary:   Ms. Kylah Gutierrez is a very pleasant 57 year old female with a past medical history of coronary artery disease, status post PCI, asthma, history of increased intracranial pressure causing papilledema, hypothyroidism, chronic kidney disease, and obstructive sleep apnea, who initially presented on 08/30/22 from an outside facility with altered mental status and expressive aphasia. Initial CT and CTA were unremarkable.  She was electively intubated here to facilitate brain imaging given agitation and restlessness. MRI of the brain showed no acute finding. Presentation felt to be consistent with toxic metabolic encephalopathy due to inadvertent amitriptyline toxicity. Cardiology was consulted due to echocardiogram findings suggesting progression of known mod-severe aortic stenosis into the severe range.    Assessment & Plan   1. Severe aortic stenosis  - Previous echocardiogram at an outside facility in 12/2021 showing moderate to severe aortic stenosis with mean gradient of 32 mmHg and peak velocity of 3.95 mmHg.    - Echocardiogram here showing some progression with severe aortic stenosis with mean gradient 50 mm hg and ANTONIO 0.87 cm2. Systolic anterior motion of the chordal apparatus noted. LVEF preserved at 60-65%.     2.  Suspected toxic metabolic encephalopathy due to inadvertent amitriptyline toxicity, resolved    3.  History of coronary artery disease  - Reportedly suffered an MI in 12/2021 status post PCI with details unclear at this point. Will attempt to obtain records from the outside facility where she had her PCI completed.  - Stable without anginal symptoms at this time.    Plan:   1.  Continue current cardiac regimen with aspirin, Brilinta, high intensity statin, and beta-blocker.  2. Appears she has had some progression in her aortic stenosis over the past 9 months in  comparison to the report from her previous echo in December. She has a history of syncopal episodes, but it sounds like these have resolved without recurrence following her PCI. She seems to be minimally symptomatic with her aortic stenosis at the time being and is stable from a cardiac standpoint for discharge. She is established with a cardiologist and Viola Gardner and would like to follow-up as an outpatient for consideration of surgery for elective aortic valve replacement verus TAVR.  Recommend close follow-up with her primary cardiology team in about 1 to 2 weeks post discharge. Counseled on symptoms to watch for including chest pain, worsening exertional dyspnea, dizziness, presyncope, or syncope and encouraged her to return in the interim for more urgent evaluation and management should these occur.  3. No further recommendations from a cardiology standpoint at this time.  We will sign off. Please not hesitate to call with questions    I spent 40 minutes face-to-face or coordinating care of Kylah Gutierrez. Over 50% of our time on the unit was spent counseling the patient and/or coordinating care.    Thank you for the opportunity to participate in this pleasant patient's care.     ILANA Washington, CNP   Nurse Practitioner  [facility]  Pager: [phone]  Text Page  (8am - 5pm, M-F)    Code Status    Full Code    Reason for Consult   Reason for consult: I was asked by Dr. Dean Conner to evaluate this patient for severe arctic stenosis.    Primary Care Physician   Dr. Ronna Jean    Chief Complaint   Altered mental status    History is obtained from the patient    History of Present Illness   Ms. Kylah Gutierrez is a very pleasant 57 year old female with a past medical history of coronary artery disease, status post PCI, asthma, history of increased intracranial pressure causing papilledema, hypothyroidism, stage 2-3A chronic kidney disease, and obstructive sleep apnea, who initially  presented on 08/30/22 from an outside facility with altered mental status and expressive aphasia. Initial CT and CTA were unremarkable.  She was electively intubated here to facilitate brain imaging given agitation and restlessness. MRI of the brain showed no acute finding. Presentation felt to be consistent with toxic metabolic encephalopathy due to inadvertent amitriptyline toxicity. Cardiology was consulted due to echocardiogram findings suggesting progression of known mod-severe aortic stenosis into the severe range.    EKG upon presentation showed normal sinus rhythm without any acute ischemic changes. N-terminal proBNP level was within normal limits at 304. High-sensitivity troponin level was within normal limits with a level of 9. The patient has now been extubated and mental status has returned to her baseline.    The patient lives near [place] in [facility] roughly between [place] and [place], [place]. She states that she had a heart attack around December 2021 and had 3 stents placed at a hospital in [place] at that time. I have attempted to request records through [facility], but have been unsuccessful thus far in obtaining records regarding this or the report from her angiogram at that time. She was told that she had aortic stenosis at that time with continued monitoring recommended.     A most recent echocardiogram at an outside facility in 12/20/2021 showed a moderate to severe arctic stenosis with a mean gradient of 32 mmHg and peak velocity of 3.95 mmHg. Repeat echocardiogram here shows progression of her aortic stenosis into the severe range with a mean gradient of 50 mmHg and ANTONIO of 0.87 cm . LVEF is preserved at 60 to 65%.  The patient currently denies symptoms of with no chest pain, shortness of breath, orthopnea, PND, lower extremity edema, dizziness, or lightheadedness.     Ms. Gutierrez does report intermittent exertional dyspnea at times, but has not been  "significantly limited in her typical activity level. She has attributed her shortness of breath to her history of moderate persistent asthma. She does note that she has always been \"a fainter,\" and that leading up to her recent heart attack she was having frequent presyncope and syncopal episodes after a couple times a week. She reports that her symptoms improved significantly following her PCI to the point where she has not had any recurrent syncopal episodes in the past 6-9 months.    Past Medical History   I have reviewed this patient's medical history and updated it with pertinent information if needed.     PMH: Coronary artery disease, status post PCI, mod-severe aortic stenosis, asthma, history of increased intracranial pressure causing papilledema, hypothyroidism, chronic kidney disease stage II/IIIA, and obstructive sleep apnea    Past Surgical History   I have reviewed this patient's surgical history and updated it with pertinent information if needed.  Past Surgical History:   Procedure Laterality Date     AS KNEE SCOPE, DIAGNOSTIC       PARTIAL THROMBOPLASTIN TIME       TUBAL LIGATION       Prior to Admission Medications   Prior to Admission Medications   Prescriptions Last Dose Informant Patient Reported? Taking?   Vitamin D3 (CHOLECALCIFEROL) 25 mcg (1000 units) tablet  Self Yes Yes   Sig: Take 1 tablet by mouth daily   acetaZOLAMIDE (DIAMOX SEQUELS) 500 MG 12 hr capsule  Self No Yes   Sig: Take 1 capsule (500 mg) by mouth 2 times daily   albuterol (PROAIR HFA/PROVENTIL HFA/VENTOLIN HFA) 108 (90 Base) MCG/ACT inhaler  Self Yes Yes   Sig: Inhale 2 puffs into the lungs every 4 hours as needed   amitriptyline (ELAVIL) 100 MG tablet  Self Yes Yes   Sig: Take 200 mg by mouth At Bedtime   aspirin 81 MG EC tablet  Self Yes Yes   Sig: Take 81 mg by mouth daily   famotidine (PEPCID) 20 MG tablet  Self Yes Yes   Sig: Take 20 mg by mouth 2 times daily   levothyroxine (SYNTHROID/LEVOTHROID) 175 MCG tablet  Self Yes " Yes   Sig: Take 175 mcg by mouth daily   losartan (COZAAR) 50 MG tablet  Self Yes Yes   Sig: Take 50 mg by mouth daily   metoprolol tartrate (LOPRESSOR) 25 MG tablet  Self Yes Yes   Sig: Take 25 mg by mouth 2 times daily   omeprazole (PRILOSEC) 20 MG CR capsule  Self Yes Yes   Sig: Take 20 mg by mouth 2 times daily   rosuvastatin (CRESTOR) 40 MG tablet  Self Yes Yes   Sig: Take 40 mg by mouth At Bedtime   ticagrelor (BRILINTA) 90 MG tablet  Self Yes Yes   Sig: Take 90 mg by mouth 2 times daily      Facility-Administered Medications: None     Allergies   Allergies   Allergen Reactions     Atorvastatin Rash     Erythromycin Rash     Diamox [Acetazolamide]      Social History   I have reviewed this patient's social history and updated it with pertinent information if needed. Kylah Gutierrez denies any history of or ongoing use of tobacco, alcohol, or drug abuse.    Family History   I have reviewed this patient's family history and updated it with pertinent information if needed. She reports her grandfather  at a young age in his late 30s of heart attack.  She otherwise denies any significant cardiac history in her parents or 1 sibling (a half sister).    Review of Systems   The 10 point Review of Systems is negative other than noted in the HPI or here.    Physical Exam   Temp: 97.5  F (36.4  C) Temp src: Oral BP: (!) 148/86 Pulse: 75   Resp: 18 SpO2: 95 % O2 Device: None (Room air)    Vital Signs with Ranges  Temp:  [97.5  F (36.4  C)-98.1  F (36.7  C)] 97.5  F (36.4  C)  Pulse:  [65-86] 75  Resp:  [17-29] 18  BP: (148-168)/() 148/86  SpO2:  [95 %-99 %] 95 %  182 lbs 12.18 oz    Constitutional: Appears her stated age, well nourished, and in no acute distress.  Eyes: Pupils equal, round. Sclerae anicteric.   HEENT: Normocephalic, atraumatic.   Neck: Supple.  No JVD appreciated.  Respiratory: Breathing non-labored. Lungs clear to auscultation bilaterally. No crackles, wheezes, rhonchi, or rales.  Cardiovascular:  "Regular rate and rhythm, normal S1 and S2.  Grade 3/6 systolic murmur heard throughout. No rub or gallop.  GI: Soft, non-distended, non-tender.  Skin: Warm, dry. No apparent rashes.  Musculoskeletal/Extremities: Moves all extremities well and symmetrically. No lower extremity edema bilaterally.  Neurologic: No gross focal deficits. Alert, awake, and oriented to person, place and time.  Psychiatric: Affect appropriate. Mentation normal.    Data   Results for orders placed or performed during the hospital encounter of 08/30/22 (from the past 24 hour(s))   Potassium   Result Value Ref Range    Potassium 3.5 3.4 - 5.3 mmol/L     Recent Labs   Lab 08/30/22  1116   NTBNPI 304     Recent Labs   Lab 08/30/22  0555   TROPONINIS 9      Clinically Significant Risk Factors Present on Admission            # Obesity: Estimated body mass index is 31.37 kg/m  as calculated from the following:    Height as of this encounter: 1.626 m (5' 4\").    Weight as of this encounter: 82.9 kg (182 lb 12.2 oz).    Cardiovascular: Non-Rheumatic Valve Disease: Aortic valve stenosis    Nephrology: CKD POA List: Stage 2 (GFR 60-89)    Neurology: Encephalopathy: Toxic encephalopathy    This mnote was completed in part using Dragon voice recognition software. Although reviewed after completion, some word and grammatical errors may occur.      "

## 2022-09-02 NOTE — CONSULTS
Consult Date: 09/01/2022    PRIMARY CARE PHYSICIAN:  Dr. Trista Banks at [facility].    PRIMARY CARDIOLOGIST:  In [place], [place].    CHIEF COMPLAINT:  Altered mental status, expressive aphasia.    HISTORY OF PRESENT ILLNESS:  Kylah Gutierrez is a 57-year-old female who presents from outside hospital who has a history of coronary artery disease, status post PCI, asthma, history of increased intracranial pressure causing papilledema, hypothyroidism, chronic kidney disease, obstructive sleep apnea, who initially presented to [facility] from an outside facility with altered mental status and expressive aphasia.  Initial CT and CTA were unremarkable.  The patient was sent to [facility], where she was electively intubated to facilitate brain imaging given agitation and restlessness.  In addition to her CTA, she underwent MRI of the brain.  This showed no acute finding.  Again, seen partially empty sella with slight distention of the optic nerve sheath and correlate for symptom of idiopathic intracranial hypertension.  The patient underwent neurocritical care.  Ammonia levels were normal.  Tylenol and salicylate levels were negative.  Ethanol levels were normal.  The patient was subsequently extubated.  She was placed on empiric antibiotics.  Chest x-ray showed left lung infiltrate with moderate secretions.  Procalcitonin was only 0.12.  She was treated with Unasyn briefly and sputum cultures were ordered, but are negative to date.  Her hemoglobin has been stable.  It was thought that the patient could possibly take him too much amitriptyline.  Her QRS was prolonged.  It was not unclear whether the patient took too much of that.  The patient's amitriptyline was held.  Her QRS came back within normal intervals.  Patient is now extubated and is stable to go to a medical floor.  The patient still has some mild confusion.  It is also noted that the echocardiogram showed severe aortic  stenosis with aortic valve area of 0.87 cm2.  Previously in December, it was 1.5 cm.  Cardiology has been consulted.    PAST MEDICAL HISTORY:    1.  Asthma.  2.  GERD.  3.  Hypertension.  4.  Papilledema.  5.  Dyslipidemia.  7.  Hypothyroidism.  8.  Lumbago.  9.  Medial malleolar fracture.  10.  History of syncope and trigeminal neuralgia.  11.  Bronchitis.  12.  Chronic back pain.  13.  Hyperlipidemia.  14  Idiopathic intracranial hypertension.    PAST SURGICAL HISTORY:  Knee arthroscopy, foot surgery, thyroidectomy, tubal ligation, colonoscopy.  No thrombectomy.    FAMILY HISTORY:  Daughter with asthma.  Mother with arthritis, diabetes, hypertension, thyroid disease.    SOCIAL HISTORY:  Former smoker, half pack a day for 20 years.  No alcohol.    ALLERGIES:  ATORVASTATIN, ERYTHROMYCIN, DIAMOX.    OUTPATIENT MEDICATION LIST: Includes  1.  Acetazolamide.   2.  Albuterol.  3.  Amitriptyline.  4.  Aspirin.  5.  Famotidine.  6.  Levothyroxine.  7.  Losartan.  8.  Metoprolol.  9.  Omeprazole.  10.  Rosuvastatin.  11.  Ticagrelor.  12.  Vitamin D.    CURRENT MEDICATION LIST:  Include  1.  Unasyn.  2.  Aspirin.  3.  Pepcid.  4.  Levothyroxine.  5.  Losartan.  6.  Metoprolol.  7.  Pantoprazole.  8.  Rosuvastatin.  9.  Ticagrelor  10.  Vitamin D.    REVIEW OF SYSTEMS:  Ten points reviewed.  Denies any difficulty swallowing.  Denies any cardiopulmonary complaints.  Denies any headache, fever or cough.  Denies abdominal pain or diarrhea.  Otherwise, 10 points reviewed and otherwise negative.    PHYSICAL EXAMINATION:    VITAL SIGNS:  Temperature 97.7, heart rate 72, respiration 18, blood pressure 154/111.  Saturations are 99% on room air.  GENERAL:  The patient is alert, oriented x 3.  HEENT:  Pupils equal.  Sclerae are anicteric.  Mucous membranes are moist.  NECK:  JVP is not elevated.  PULMONARY:  Lungs are clear to auscultation.  CARDIOVASCULAR:  S1, S2, regular rate and rhythm.  She has a 2/6 systolic  murmur.  GASTROINTESTINAL:  Abdomen is soft, nontender, normoactive bowel sounds.  MUSCULOSKELETAL:  Shows no edema.  NEUROLOGIC:  Grossly nonfocal.  Cranial nerves grossly intact.  SKIN:  Warm, dry, well perfused.  PSYCHIATRIC:  Mood and affect normal.    LABORATORY DATA:  As dictated in history of present illness.  Specifically, her potassium is 2.5, sodium 148.  White count 8.3, hemoglobin 10.9, platelets 274,000.    ASSESSMENT:  Kylah Gutierrez is 57 who presented at an outside hospital with altered mental status, expressive aphasia, prolonged QRS and increased intracranial pressure, who was found to have CT, CTA without any acute findings.  MRI showing no stroke, thought to be alteration for toxic metabolic encephalopathy, possible amitriptyline overdose, now extubated and is back to her baseline mental status, who required airway protection and intubation due to severe agitation, now back to baseline mentation, but finding of worsening aortic stenosis.    PLAN:    1.  Altered mental status with expressive aphasia, suspected toxic metabolic encephalopathy.  The patient's CT of the head and CTA and MRI did not find any findings.  Drug levels including Tylenol, salicylate, ethanol, ammonia levels were all negative.  She was seen by neurocritical care.  There is no evidence of any seizures.  It is thought that her encephalopathy likely due to tricyclic toxicity.  She is back to baseline mentation.  2.  Acute respiratory failure.  The patient was intubated for airway protection due to her agitation.  Chest x-ray showed possible left lung infiltrate and moderate secretions.  Procalcitonin level was 0.12.  She was placed on Unasyn.  Blood and sputum cultures were negative.  No evidence of any clinical pneumonia, so this will be discontinued.  3.  Worsening aortic stenosis.  The patient's echocardiogram showed normal left ventricular ejection fraction 60%, heavily calcified aortic valve with moderate to severe stenosis  and mild regurgitation.  The patient is to continue her metoprolol.  The patient will resume her aspirin and Brilinta.  She does have a cardiologist in [place], who is aware of her aortic stenosis and wanted to monitor this.  However, it is not clear whether he is aware of the significant progression of her aortic valve stenosis.  We will await cardiology consultation.  4.  Hypokalemia, hypernatremia.  The patient will be on half normal saline with 20 of potassium.  5.  Hypothyroid, we will continue the patient on levothyroxine.  6.  History of reflux disease.  The patient will be continued on Prilosec or Protonix.  7.  Deep venous thrombosis prophylaxis:  The patient will have compression boots.  8.  Code status:  Full Code.    Foster Bartlett MD        D: 2022   T: 2022   MT: [id number]    Name:     FARHAT COSME  MRN:      [id number]        Account:      [id number]   :      1965           Consult Date: 2022     Document: [id number]

## 2022-09-02 NOTE — DISCHARGE SUMMARY
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  No follow up labs or test are needed.  Follow up with primary cardiologist in 1-2 weeks to discuss aortic   stenosis (narrowing of your aortic valve).             Unresulted Labs Ordered in the Past 30 Days of this Admission     Date and Time Order Name Status Description    8/30/2022  8:12 AM Amitriptyline and Nortriptyline, Serum or Plasma In process     8/30/2022  6:25 AM Blood Culture Arm, Left Preliminary     8/30/2022  6:25 AM Blood Culture Hand, Right Preliminary       These results will be followed up by: Hospitalist group     Discharge Disposition   Discharged to home  Condition at discharge: Stable    Hospital Course   Kylah Gutierrez is a 57 year old female admitted on 8/30/2022.  She presented at an outside hospital with altered mental status, expressive aphasia, prolonged QRS and increased intracranial pressure, who was found to have CT, CTA without any acute findings.  MRI showing no stroke, thought to be toxic metabolic encephalopathy due to possible amitriptyline overdose, now extubated (required intubation to assist with imaging in setting of agitation) and is back to her baseline mental status.  New finding of worsening aortic stenosis.        Toxic metabolic encephalopathy with expressive aphasia, suspected due to unintentional tricyclic  toxicity, resolved  CT of the head and CTA and MRI negative for acute findings.  Drug levels including Tylenol, salicylate, ethanol, ammonia levels were all negative.  She was seen by neurocritical care.  There is no evidence of any seizures.  It is thought that her encephalopathy was likely due to tricyclic toxicity (she cannot recall taking an extra dose of medication, but states it is a possibility).    - stable at baseline mental status     Idiopathic intracranial hypertension  - follow up with outpatient Neurologist for worsening headaches, hold tricyclic medication at this time per Neuro     Acute respiratory failure, resolved  The patient was intubated for airway protection due to her agitation to facilitate brain imaging.  Chest x-ray showed possible left lung infiltrate and moderate secretions.  Procalcitonin level was 0.12.  She was placed on Unasyn.  Blood and sputum cultures were negative.  No evidence of any clinical pneumonia, so Unasyn discontinued after 3 days.  - stable on room air     Aortic stenosis, mod-severe  TTE showed normal left ventricular ejection fraction 60%, heavily calcified aortic valve with moderate to severe stenosis and mild regurgitation.    - continue PTA metoprolol, aspirin, Brilinta  - follow up with primary Cardiologist in 1-2 weeks per Cards     Hypokalemia, hypernatremia, resolved  Due to lack of oral intake, resolved with resumption of diet     Hypothyroid  - continue PTA levothyroxine.     History of reflux disease  - continue PTA PPI      Consultations This Hospital Stay   NEUROLOGY CRITICAL CARE ADULT IP CONSULT  CARE MANAGEMENT / SOCIAL WORK IP CONSULT  VASCULAR ACCESS ADULT IP CONSULT  SPEECH LANGUAGE PATH ADULT IP CONSULT  PSYCHIATRY IP CONSULT  CARDIOLOGY IP CONSULT    Code Status   Full Code    Time Spent on this Encounter   I, Foster Rojas MD, personally saw the patient today and spent greater than 30 minutes discharging this patient.       Foster Rojas MD  M  [place] [address] [facility]  [address] [place] [address]  Phone: [phone]  ______________________________________________________________________    Physical Exam   Vital Signs: Temp: 97.6  F (36.4  C) Temp src: Oral BP: (!) 176/98 Pulse: 68   Resp: 16 SpO2: 97 % O2 Device: None (Room air)    Weight: 182 lbs 12.18 oz  General Appearance: Well nourished female in NAD  Respiratory: lungs CTAB, no wheezes or crackles, no tachypnea   Cardiovascular: RRR, normal s1/s2 with 2/6 systolic murmur  GI: abdomen soft, normal bowel sounds  Skin: no peripheral edema   Other: Alert and appropriate, cranial nerves grossly intact         Primary Care Physician   Ronna Jean    Discharge Orders      Reason for your hospital stay    You were admitted for altered mental status, the cause of which remains unclear, but may be due to amitriptyline toxicity.     Follow-up and recommended labs and tests     Follow up with primary care provider, Ronna Jean, within 7 days for hospital follow- up.  No follow up labs or test are needed.  Follow up with primary cardiologist in 1-2 weeks to discuss aortic stenosis (narrowing of your aortic valve).     Activity    Your activity upon discharge: activity as tolerated     Diet    Follow this diet upon discharge:       Combination Diet Regular Diet; Mildly Thick (level 2); No Straws       Significant Results and Procedures   Most Recent 3 CBC's:Recent Labs   Lab Test 08/31/22  0542 08/30/22  0555   WBC 8.3 8.0   HGB 10.9* 10.7*   MCV 84 86    292     Most Recent 3 BMP's:Recent Labs   Lab Test 09/01/22  1417 08/31/22  1403 08/31/22  0542 08/31/22  0007 08/30/22  1731 08/30/22  0838 08/30/22  0555   NA  --   --  148*  --   --   --  141   POTASSIUM 3.5 3.3* 2.9*  --   --   --  3.9   CHLORIDE  --   --  118*  --   --   --  115*   CO2  --   --  22  --   --   --  19*   BUN  --   --  10  --   --   --  12   CR  --   --  0.88  --   --   --  0.90   ANIONGAP   --   --  8  --   --   --  7   LIZA  --   --  8.5  --   --   --  8.3*   GLC  --   --  129* 134* 79   < > 150*    < > = values in this interval not displayed.     Most Recent 2 LFT's:Recent Labs   Lab Test 08/31/22  0542 08/30/22  0555   AST 12 12   ALT 21 22   ALKPHOS 83 82   BILITOTAL 0.2 0.1*   ,   Results for orders placed or performed during the hospital encounter of 08/30/22   XR Chest Port 1 View    Narrative    EXAM: CHEST SINGLE VIEW PORTABLE  LOCATION: [facility]  DATE/TIME: 8/30/2022 6:13 AM    INDICATION: Altered mental status. Intubated.  COMPARISON: 12/21/2019.    FINDINGS: An endotracheal tube is present with distal tip in the trachea, approximately 3.3 cm proximal to the heath. An enteric drainage tube is present with distal tip in the gastric body. Hypoinflated lungs. A few hazy opacities are present in the   lateral aspect of the mid left lung. The lungs are otherwise clear. Normal size cardiac silhouette.      Impression    IMPRESSION:   1. An endotracheal tube and enteric drainage tube are in place.  2. A few hazy opacities in the mid left lung are nonspecific, but could represent atelectasis, scarring or pneumonia.  3. No other findings suspicious for active cardiopulmonary disease.                MR Brain w/o & w Contrast    Narrative    EXAM: MR BRAIN W/O and W CONTRAST  LOCATION: [facility]  DATE/TIME: 8/30/2022 5:06 PM    INDICATION: AMS, expressive aphasia  COMPARISON: 8/4/2017  CONTRAST: 8mL Gadavist  TECHNIQUE: Routine multiplanar multisequence head MRI without and with intravenous contrast.    FINDINGS:  INTRACRANIAL CONTENTS: No acute or subacute infarct. No mass, acute hemorrhage, or extra-axial fluid collections. Single punctate focus of T2/FLAIR hyperintensity in the left frontal white matter. Normal ventricles and sulci. Normal position of the   cerebellar tonsils. No pathologic contrast enhancement.    SELLA: Empty sella morphology  with flattening of the pituitary gland along the sellar floor.    OSSEOUS STRUCTURES/SOFT TISSUES: Normal marrow signal. The major intracranial vascular flow voids are maintained.     ORBITS: Slight distention of the optic nerve sheaths.     SINUSES/MASTOIDS: No paranasal sinus mucosal disease. No middle ear or mastoid effusion.       Impression    IMPRESSION:  1.  No acute findings.  2.  Again seen is partial empty sella with slight distention of the optic nerve sheaths. Correlate for symptoms of idiopathic intracranial hypertension.   XR Abdomen Port 1 View    Narrative    XR ABDOMEN PORT 1 VIEW   2022 12:56 PM     HISTORY: MRI screening - looking for metal to verify safe to do MRI  scan    COMPARISON: None.      Impression    IMPRESSION: Partially visualized endotracheal tube with tip 3 cm above  the heath. Nasogastric tube tip and sidehole overlying the stomach.  Temperature probe overlying the bladder. No additional radiopaque  foreign body. No evidence of bowel obstruction. Lung bases are clear.  No acute bony abnormality.    VERONICA HUDSON MD         SYSTEM ID:  [id number]   Echocardiogram Complete     Value    LVEF  60-65%    Narrative    [id number]  [id number]  [id number]^HOMERO^DOMINICK^GUILLERMO     [facility]  Echocardiography Laboratory  [address] [place] [address]     Name: FARHAT COSME  MRN: [id number]  : 1965  Study Date: 2022 02:29 PM  Age: 57 yrs  Gender: Female  Patient Location: [facility]  Reason For Study: Aortic Valve Disorder  Ordering Physician: DOMINICK VALENTIN  Referring Physician: Ronna Jean  Performed By: Marisol Vieira     BSA: 1.9 m2  Height: 66 in  Weight: 181 lb  HR: 73  BP: 172/83 mmHg  ______________________________________________________________________________  Procedure  Complete Portable Echo Adult. Optison (NDC #[id number]) given  intravenously.  ______________________________________________________________________________  Interpretation Summary     Left ventricular systolic function is normal. The visual ejection fraction is  60-65%.  The right ventricular systolic function is normal.  Severe valvular aortic stenosis- mean gradients 50 mm hg, ANTONIO 0.87 cm2.  There is systolic anterior motion of the chordal apparatus.There is mild (1+)  mitral regurgitation.     No prior study for comparison.     ______________________________________________________________________________  Left Ventricle  The left ventricle is normal in size. There is normal left ventricular wall  thickness. Left ventricular systolic function is normal. The visual ejection  fraction is 60-65%. Diastolic Doppler findings (E/E' ratio and/or other  parameters) suggest left ventricular filling pressures are indeterminate.  Septal motion is consistent with conduction abnormality.     Right Ventricle  The right ventricle is normal size. The right ventricular systolic function is  normal.     Atria  The left atrium is mildly dilated. Right atrial size is normal. There is no  color Doppler evidence of an atrial shunt.     Mitral Valve  There is systolic anterior motion of the chordal apparatus. There is mild (1+)  mitral regurgitation. There is no mitral valve stenosis.     Tricuspid Valve  There is trace tricuspid regurgitation. Right ventricular systolic pressure  could not be approximated due to inadequate tricuspid regurgitation.     Aortic Valve  There is mild (1+) aortic regurgitation. Severe valvular aortic stenosis. The  mean AoV pressure gradient is 50.4 mmHg. The calculated aortic valve are is  0.87 cm^2.     Pulmonic Valve  The pulmonic valve is not well visualized. There is no pulmonic valvular  stenosis.     Vessels  The aortic root is normal size. Normal size ascending aorta.     Pericardium  There is no pericardial effusion.     Rhythm  Sinus rhythm was noted. The  patient exhibited occasional PVCs.  ______________________________________________________________________________  MMode/2D Measurements & Calculations  IVSd: 1.1 cm     LVIDd: 4.1 cm  LVIDs: 2.6 cm  LVPWd: 1.2 cm  FS: 37.4 %  LV mass(C)d: 156.1 grams  LV mass(C)dI: 81.4 grams/m2  Ao root diam: 2.7 cm  asc Aorta Diam: 3.2 cm  LVOT diam: 1.7 cm  LVOT area: 2.4 cm2  LA Volume (BP): 43.4 ml  LA Volume Index (BP): 22.6 ml/m2  RWT: 0.57     Doppler Measurements & Calculations  MV E max laci: 75.2 cm/sec  MV A max laci: 99.0 cm/sec  MV E/A: 0.76  MV max P.3 mmHg  MV mean P.3 mmHg  MV V2 VTI: 30.6 cm  MVA(VTI): 3.0 cm2  MV dec slope: 333.6 cm/sec2  MV dec time: 0.23 sec  Ao V2 max: 476.3 cm/sec  Ao max P.7 mmHg  Ao V2 mean: 336.0 cm/sec  Ao mean P.4 mmHg  Ao V2 VTI: 105.3 cm  ANTONIO(I,D): 0.87 cm2  ANTONIO(V,D): 0.95 cm2  AI P1/2t: 348.5 msec  LV V1 max P.5 mmHg  LV V1 max: 190.5 cm/sec  LV V1 VTI: 38.5 cm  SV(LVOT): 91.7 ml  SI(LVOT): 47.8 ml/m2  PA acc time: 0.09 sec  AV Laci Ratio (DI): 0.40  ANTONIO Index (cm2/m2): 0.45  E/E' av.9  Lateral E/e': 5.7  Medial E/e': 14.1     ______________________________________________________________________________  Report approved by: Olga Flores 2022 04:01 PM               Discharge Medications   Current Discharge Medication List      CONTINUE these medications which have NOT CHANGED    Details   acetaZOLAMIDE (DIAMOX SEQUELS) 500 MG 12 hr capsule Take 1 capsule (500 mg) by mouth 2 times daily  Qty: 60 capsule, Refills: 9    Associated Diagnoses: Optic disc edema      albuterol (PROAIR HFA/PROVENTIL HFA/VENTOLIN HFA) 108 (90 Base) MCG/ACT inhaler Inhale 2 puffs into the lungs every 4 hours as needed      aspirin 81 MG EC tablet Take 81 mg by mouth daily      famotidine (PEPCID) 20 MG tablet Take 20 mg by mouth 2 times daily      levothyroxine (SYNTHROID/LEVOTHROID) 175 MCG tablet Take 175 mcg by mouth daily      losartan (COZAAR) 50 MG tablet Take 50 mg  by mouth daily      metoprolol tartrate (LOPRESSOR) 25 MG tablet Take 25 mg by mouth 2 times daily      omeprazole (PRILOSEC) 20 MG CR capsule Take 20 mg by mouth 2 times daily      rosuvastatin (CRESTOR) 40 MG tablet Take 40 mg by mouth At Bedtime      ticagrelor (BRILINTA) 90 MG tablet Take 90 mg by mouth 2 times daily      Vitamin D3 (CHOLECALCIFEROL) 25 mcg (1000 units) tablet Take 1 tablet by mouth daily         STOP taking these medications       amitriptyline (ELAVIL) 100 MG tablet Comments:   Reason for Stopping:             Allergies   Allergies   Allergen Reactions     Atorvastatin Rash     Erythromycin Rash     Diamox [Acetazolamide]

## 2022-09-02 NOTE — PLAN OF CARE
Patient discharging home with family. AVS gone over with patient in room and prescriptions given to patient. All Questions answered. Hospital taxi provided for ride.

## 2022-09-02 NOTE — PROGRESS NOTES
DATE & TIME: 09/01/2022 Night    Cognitive Concerns/ Orientation : Alert/Oriented; can be impulsive, setting off bed alarm despite reminders; Voice remains hoarse from intubation that was discontinued on 08/31  BEHAVIOR & AGGRESSION TOOL COLOR: Green  ABNL VS/O2: /82 otherwise VSS, room air  MOBILITY: Assist-1 gait belt  PAIN MANAGMENT: Denies   DIET: Soft, bite sized (level 6) with mildly thick liquids (Level 2), no straws  BOWEL/BLADDER: Up to bathroom, small BM overnight  ABNL LAB/BG: WNL; Respiratory aspirate culture pending  DRAIN/DEVICES: L PIV infusing 1/2 normal saline + 20 mEq Potassium at 75 mL/hour  TELEMETRY RHYTHM: Normal sinus rhythm  SKIN: Intact  TESTS/PROCEDURES: Cardiology consult  D/C DATE: Pending  OTHER IMPORTANT INFO: Small pills or crushed pills in applesauce per dietary order; Expiratory wheezes and coarse lung sounds

## 2022-09-02 NOTE — PLAN OF CARE
Goal Outcome Evaluation:    SUMMARY: AMS/Accidental Overdose/Expressive Aphasia  DATE & TIME: 6229-6755    Cognitive Concerns/ Orientation : A/Ox4   BEHAVIOR & AGGRESSION TOOL COLOR: Green - pleasant - calm/cooperative - no obvious signs of forgetfulness    ABNL VS/O2: hypertensive initial /111, 154/111 - HR 75, RR 18, 98% on RA  MOBILITY: Ax1 GB steady on feet  PAIN MANAGMENT: denies any pain  DIET: Level 6 soft/bite thickened liquids   BOWEL/BLADDER: Continent of B/BM  ABNL LAB/BG: K 3.5, Na 148,   DRAIN/DEVICES: PIV SL  TELEMETRY RHYTHM:   SKIN: intact WDL  TESTS/PROCEDURES: none this shift - cardio consult pending  D/C DATE: pending  Discharge Barriers:   OTHER IMPORTANT INFO: Intubated on the 30th extubated 31st - hoarse voice and slight discomfort still present. Coarse/expiratory wheezes present. Aortic stenosis worsening (cardio consult pending).

## 2022-09-03 NOTE — PLAN OF CARE
Speech Language Therapy Discharge Summary    Reason for therapy discharge:    Discharged to home with home therapy.    Progress towards therapy goal(s). See goals on Care Plan in [facility] electronic health record for goal details.  Goals not met.  Barriers to achieving goals:   discharge from facility.    Therapy recommendation(s):    Continued therapy is recommended.  Rationale/Recommendations:  Continue ST needs for dysphagia management and diet advancement. Complete speech/language evaluation as indicated. Patient discharged on a regular diet and mildly thick liquids. If vocal quality does not improve would benefit from an ENT consult.

## 2022-09-04 LAB
BACTERIA BLD CULT: NO GROWTH
BACTERIA BLD CULT: NO GROWTH